# Patient Record
Sex: FEMALE | Race: WHITE | NOT HISPANIC OR LATINO
[De-identification: names, ages, dates, MRNs, and addresses within clinical notes are randomized per-mention and may not be internally consistent; named-entity substitution may affect disease eponyms.]

---

## 2023-10-04 VITALS
WEIGHT: 161 LBS | HEIGHT: 60 IN | BODY MASS INDEX: 31.61 KG/M2 | RESPIRATION RATE: 51 BRPM | HEART RATE: 94 BPM | OXYGEN SATURATION: 96 % | DIASTOLIC BLOOD PRESSURE: 70 MMHG | SYSTOLIC BLOOD PRESSURE: 142 MMHG

## 2023-10-12 ENCOUNTER — NON-APPOINTMENT (OUTPATIENT)
Age: 88
End: 2023-10-12

## 2023-10-12 DIAGNOSIS — Z87.898 PERSONAL HISTORY OF OTHER SPECIFIED CONDITIONS: ICD-10-CM

## 2023-10-12 DIAGNOSIS — Z86.79 PERSONAL HISTORY OF OTHER DISEASES OF THE CIRCULATORY SYSTEM: ICD-10-CM

## 2023-10-12 DIAGNOSIS — Z80.0 FAMILY HISTORY OF MALIGNANT NEOPLASM OF DIGESTIVE ORGANS: ICD-10-CM

## 2023-10-12 DIAGNOSIS — Z86.59 PERSONAL HISTORY OF OTHER MENTAL AND BEHAVIORAL DISORDERS: ICD-10-CM

## 2023-10-12 DIAGNOSIS — Z86.39 PERSONAL HISTORY OF OTHER ENDOCRINE, NUTRITIONAL AND METABOLIC DISEASE: ICD-10-CM

## 2023-10-12 DIAGNOSIS — Z85.3 PERSONAL HISTORY OF MALIGNANT NEOPLASM OF BREAST: ICD-10-CM

## 2023-10-12 PROBLEM — Z00.00 ENCOUNTER FOR PREVENTIVE HEALTH EXAMINATION: Status: ACTIVE | Noted: 2023-10-12

## 2023-10-30 ENCOUNTER — NON-APPOINTMENT (OUTPATIENT)
Age: 88
End: 2023-10-30

## 2023-10-30 ENCOUNTER — APPOINTMENT (OUTPATIENT)
Dept: HEART AND VASCULAR | Facility: CLINIC | Age: 88
End: 2023-10-30
Payer: MEDICARE

## 2023-10-30 VITALS
HEART RATE: 97 BPM | OXYGEN SATURATION: 96 % | DIASTOLIC BLOOD PRESSURE: 79 MMHG | SYSTOLIC BLOOD PRESSURE: 167 MMHG | RESPIRATION RATE: 14 BRPM

## 2023-10-30 DIAGNOSIS — I49.3 VENTRICULAR PREMATURE DEPOLARIZATION: ICD-10-CM

## 2023-10-30 PROCEDURE — 99205 OFFICE O/P NEW HI 60 MIN: CPT | Mod: 25

## 2023-10-30 PROCEDURE — 93000 ELECTROCARDIOGRAM COMPLETE: CPT

## 2023-10-30 RX ORDER — VENLAFAXINE HYDROCHLORIDE 37.5 MG/1
37.5 CAPSULE, EXTENDED RELEASE ORAL DAILY
Refills: 0 | Status: DISCONTINUED | COMMUNITY
End: 2023-10-30

## 2023-10-30 RX ORDER — OMEPRAZOLE 40 MG/1
40 CAPSULE, DELAYED RELEASE ORAL TWICE DAILY
Refills: 0 | Status: DISCONTINUED | COMMUNITY
End: 2023-10-30

## 2023-11-09 ENCOUNTER — APPOINTMENT (OUTPATIENT)
Dept: HEART AND VASCULAR | Facility: CLINIC | Age: 88
End: 2023-11-09
Payer: MEDICARE

## 2023-11-09 PROCEDURE — 93306 TTE W/DOPPLER COMPLETE: CPT

## 2023-11-22 ENCOUNTER — APPOINTMENT (OUTPATIENT)
Dept: INTERNAL MEDICINE | Facility: CLINIC | Age: 88
End: 2023-11-22
Payer: MEDICARE

## 2023-11-22 VITALS
DIASTOLIC BLOOD PRESSURE: 74 MMHG | SYSTOLIC BLOOD PRESSURE: 152 MMHG | OXYGEN SATURATION: 97 % | RESPIRATION RATE: 16 BRPM | HEART RATE: 89 BPM | WEIGHT: 155 LBS | HEIGHT: 60 IN | BODY MASS INDEX: 30.43 KG/M2

## 2023-11-22 DIAGNOSIS — R42 DIZZINESS AND GIDDINESS: ICD-10-CM

## 2023-11-22 PROCEDURE — 99213 OFFICE O/P EST LOW 20 MIN: CPT | Mod: 25

## 2023-11-22 PROCEDURE — 99203 OFFICE O/P NEW LOW 30 MIN: CPT | Mod: 25

## 2023-11-22 PROCEDURE — 36415 COLL VENOUS BLD VENIPUNCTURE: CPT

## 2023-11-22 RX ORDER — DILTIAZEM HYDROCHLORIDE 180 MG/1
180 CAPSULE, COATED, EXTENDED RELEASE ORAL DAILY
Refills: 0 | Status: DISCONTINUED | COMMUNITY
End: 2023-11-22

## 2023-11-27 LAB
ALBUMIN SERPL ELPH-MCNC: 4.2 G/DL
ALP BLD-CCNC: 105 U/L
ALT SERPL-CCNC: 8 U/L
ANION GAP SERPL CALC-SCNC: 13 MMOL/L
AST SERPL-CCNC: 18 U/L
BASOPHILS # BLD AUTO: 0.06 K/UL
BASOPHILS NFR BLD AUTO: 0.7 %
BILIRUB SERPL-MCNC: 0.5 MG/DL
BUN SERPL-MCNC: 23 MG/DL
CALCIUM SERPL-MCNC: 9.7 MG/DL
CHLORIDE SERPL-SCNC: 95 MMOL/L
CHOLEST SERPL-MCNC: 215 MG/DL
CO2 SERPL-SCNC: 24 MMOL/L
CREAT SERPL-MCNC: 0.91 MG/DL
EGFR: 61 ML/MIN/1.73M2
EOSINOPHIL # BLD AUTO: 0.63 K/UL
EOSINOPHIL NFR BLD AUTO: 7.2 %
GLUCOSE SERPL-MCNC: 107 MG/DL
HCT VFR BLD CALC: 42.3 %
HDLC SERPL-MCNC: 56 MG/DL
HGB BLD-MCNC: 14.4 G/DL
IMM GRANULOCYTES NFR BLD AUTO: 0.5 %
LDLC SERPL CALC-MCNC: 138 MG/DL
LYMPHOCYTES # BLD AUTO: 1.36 K/UL
LYMPHOCYTES NFR BLD AUTO: 15.6 %
MAN DIFF?: NORMAL
MCHC RBC-ENTMCNC: 31.6 PG
MCHC RBC-ENTMCNC: 34 GM/DL
MCV RBC AUTO: 93 FL
MONOCYTES # BLD AUTO: 0.64 K/UL
MONOCYTES NFR BLD AUTO: 7.3 %
NEUTROPHILS # BLD AUTO: 5.99 K/UL
NEUTROPHILS NFR BLD AUTO: 68.7 %
NONHDLC SERPL-MCNC: 160 MG/DL
PLATELET # BLD AUTO: 261 K/UL
POTASSIUM SERPL-SCNC: 4.7 MMOL/L
PROT SERPL-MCNC: 7 G/DL
RBC # BLD: 4.55 M/UL
RBC # FLD: 13.2 %
SODIUM SERPL-SCNC: 132 MMOL/L
TRIGL SERPL-MCNC: 120 MG/DL
WBC # FLD AUTO: 8.72 K/UL

## 2023-11-29 RX ORDER — ATORVASTATIN CALCIUM 10 MG/1
10 TABLET, FILM COATED ORAL DAILY
Qty: 90 | Refills: 1 | Status: ACTIVE | COMMUNITY
Start: 1900-01-01 | End: 1900-01-01

## 2023-11-30 LAB — TSH SERPL-ACNC: 2.85 UIU/ML

## 2023-12-07 ENCOUNTER — APPOINTMENT (OUTPATIENT)
Dept: HEART AND VASCULAR | Facility: CLINIC | Age: 88
End: 2023-12-07
Payer: MEDICARE

## 2023-12-07 ENCOUNTER — APPOINTMENT (OUTPATIENT)
Dept: INTERNAL MEDICINE | Facility: CLINIC | Age: 88
End: 2023-12-07
Payer: MEDICARE

## 2023-12-07 VITALS
HEART RATE: 85 BPM | HEIGHT: 62 IN | RESPIRATION RATE: 15 BRPM | SYSTOLIC BLOOD PRESSURE: 142 MMHG | DIASTOLIC BLOOD PRESSURE: 74 MMHG | BODY MASS INDEX: 28.52 KG/M2 | WEIGHT: 155 LBS | OXYGEN SATURATION: 98 %

## 2023-12-07 DIAGNOSIS — R05.3 CHRONIC COUGH: ICD-10-CM

## 2023-12-07 PROCEDURE — 99214 OFFICE O/P EST MOD 30 MIN: CPT

## 2023-12-07 PROCEDURE — 99213 OFFICE O/P EST LOW 20 MIN: CPT

## 2024-01-04 ENCOUNTER — APPOINTMENT (OUTPATIENT)
Dept: HEART AND VASCULAR | Facility: CLINIC | Age: 89
End: 2024-01-04
Payer: MEDICARE

## 2024-01-04 VITALS
HEART RATE: 83 BPM | TEMPERATURE: 97.4 F | HEIGHT: 62 IN | DIASTOLIC BLOOD PRESSURE: 79 MMHG | SYSTOLIC BLOOD PRESSURE: 129 MMHG | OXYGEN SATURATION: 98 % | RESPIRATION RATE: 16 BRPM

## 2024-01-04 PROCEDURE — G2211 COMPLEX E/M VISIT ADD ON: CPT

## 2024-01-04 PROCEDURE — 99214 OFFICE O/P EST MOD 30 MIN: CPT

## 2024-01-04 NOTE — ASSESSMENT
[FreeTextEntry1] : TARA COLEMAN is an 88 year F with past medical history significant for HTN, hypothyroidism, CVA (without residual neuro deficit), breast cancer s/p right partial mastectomy (2006) s/p RT (2006), anxiety disorder,. She is here for follow-up post ED visit for palpitations. She was also previously in the ED for syncope? TIA?. She has progressive weakness and decrease functional capacity. She has chronic limited exercise capacity, she needs assistance. She is on wheelchair. Holter recorded brief runs of PSVT and NSVT. Echo was technically difficult. No significant valve disease. Endocardial wall not well visualized. BP is better controlled. She declines having ischemia work-up.   - I reviewed Holter  - I reviewed Echo report - I reviewed labs  - I reviewed CT head - start a trial of ASA 81 mg QOD for now.  - increase diltiazem to 360 mg daily for better arrhythmia suppression.  - decrease valsartan 80 mg daily  - we discussed the option of taking statins --> she declines - continue taking other cardiac meds  - will plan to repeat holter to assess arrhythmia control. - We discussed the option of having a NST. For now she does not wish to have any further cardiac testing. She will think about it.  - she will monitor her BP at home and bring a BP log to next visit   Patient advised to go to the nearest ED whenever any symptoms persist and/or worsens.  Patient/Family/Caregiver verbalized complete understanding of these instructions.  I spent a total of 25 minutes with more than 50% spent on counseling and coordinating care.    RTO in 4 wks .

## 2024-01-04 NOTE — HISTORY OF PRESENT ILLNESS
[FreeTextEntry1] : TARA COLEMAN is a 88 year y.o. F with medical history significant for HTN, hypothyroidism, CVA (without residual neuro deficit), anxiety disorder, breast cancer s/p right partial mastectomy (2006) s/p RT (2006). She is here with her home attendant.  She is here for follow-up.  She reports having on and off palpitations. Occurs almost daily. Sometimes can last up to hours.  She was in the ED on 12/27/23 for palpitations. Work-up was neg. She was not admitted.   Echo was technically difficult. unable to visualize well the endocardial wall. No significant valve disease. Grade IDD.  Holter recorded brief runs of PSVT and NSVT.  BP has been better controlled on higher cardizem dose. She overall feels well.  No recurrent syncope.  She has no cardiac complaints at this time.  She states she has been anxious.   Her functional capacity has deteriorated. She is on wheelchair. Her legs are weak and needs assistance.    Denies CP, SOB, palpitations, orthopnea, edema Patient denies changes in her exercise tolerance during the past 6 months. She has limited functional capacity. She walks in her apartment with a walker and goes out mostly on a wheelchair.  She reports having poor balance since 2019.  She reports on and off headaches. She has not seen neuro  Sleeps with 3 pillows due to GERD.    Of note,  In Oct 2023, she had an ED visit for "syncope".  She reports that she was in shower with her home attendant assisting her as usual. When she was ready to exit the shower she was unable to the lift her right leg to get out of the tub. She tried a couple of times and her home attendant helped her get out and sit on the toilet. She subsequently became unresponsive and weak. She leaned her against the sink. Home attendant thought patient had fainted. The patient states she was able to hear the home attendant speaking to her but she was unable to respond. The patient denies palpitations, CP, SOB, dizziness. The home attendant states episode lasted about 10 min. She called EMS and by the time they arrived she was back to her usual state of health. Her BP was normal. She was taken to the ED ([facility]). CT head without acute findings. Trops were ok. She was not admitted. She was discharged with outpatient follow-up. TIA?    She denies history of MI, DM, smoking.  Denies family history of premature ASCVD and /or SCD  No hospitalizations in the past 3 years.   DATA LABS (12/27/23): Trop Neg; Hgb 14.6; Hct 44.1; Cre 0.9; K 4.1; LFTs wnl; eGFR 61;  (11/22/23) HGB 14.4, HCT 42.3, K 4.7, CRE 0.91, AST 18, ALT 8, , eGFR 61, , , HDL 56, , NON-, TSH 2.85. (10/18/23): Cre 0.7; K 4.1; eGFR 73; LFTs wnl; Hgb 13.9; Hct 40.2; Plt 279;  (10/10/23): INR 1.1; HS trop 5 x2 (nl); eGFR 54; ALT 59; AST 41; Cre 1.0; K 3.9; Hgb 14.2; Hct 41.8;   ECG (10/30/23): NSR at 89 bpm w nl axis, first degree AVB (Alecia= 226 ms), LVH, PVCs, PRWP and nonspecific ST abn.   ECHO (11/9/23): Technically difficult study. The LV endocardium is not well visualized; cannot r/o RWMA. Grade IDD w normal filling pressures. Mildly dilated LA. AV sclerosis w/o stenosis. No significant valve disease   CXR (12/27/23): The cardiac silhouette is wnl. No definite focal consolidation. Elevation of the right hemidiaphragm.   Holter x6d (11/30/23): min 49 bpm, Avg 69 bpm, Max 132 bpm. NSR with brief PSVT x 13 (longest 7 beats) and brief NSVT x 3 (longest 8 beats). rare PVCs <1%. No pauses.   CT head (10/10/23): No CT evidence of acute ICH or cerebral edema. Chronic infarct within the inferior right frontal lobe and microvascular ischemic changes are noted.

## 2024-01-04 NOTE — REVIEW OF SYSTEMS
[Feeling Fatigued] : feeling fatigued [Dyspnea on exertion] : dyspnea during exertion [Dizziness] : dizziness [Limb Weakness (Paresis)] : limb weakness (Paresis) [Anxiety] : anxiety [Palpitations] : palpitations [Fever] : no fever [Headache] : no headache [Chills] : no chills [SOB] : no shortness of breath [Chest Discomfort] : no chest discomfort [Lower Ext Edema] : no extremity edema [Leg Claudication] : no intermittent leg claudication [Orthopnea] : no orthopnea [PND] : no PND [Syncope] : no syncope [Cough] : no cough [Wheezing] : no wheezing [Coughing Up Blood] : no hemoptysis [Tremor] : no tremor was seen [Convulsions] : no convulsions [Tingling (Paresthesia)] : no tingling [Confusion] : no confusion was observed [Depression] : no depression [Under Stress] : not under stress [Suicidal] : not suicidal [Easy Bleeding] : no tendency for easy bleeding

## 2024-01-04 NOTE — PHYSICAL EXAM
[Well Developed] : well developed [Well Nourished] : well nourished [No Acute Distress] : no acute distress [Normal Venous Pressure] : normal venous pressure [No Carotid Bruit] : no carotid bruit [Normal S1, S2] : normal S1, S2 [No Murmur] : no murmur [No Rub] : no rub [No Gallop] : no gallop [Clear Lung Fields] : clear lung fields [Good Air Entry] : good air entry [No Respiratory Distress] : no respiratory distress  [No Edema] : no edema [No Cyanosis] : no cyanosis [No Clubbing] : no clubbing [Moves all extremities] : moves all extremities [No Focal Deficits] : no focal deficits [Normal Speech] : normal speech [Alert and Oriented] : alert and oriented [Normal memory] : normal memory [de-identified] : on wheelchair, needs assistance

## 2024-01-30 ENCOUNTER — APPOINTMENT (OUTPATIENT)
Dept: INTERNAL MEDICINE | Facility: CLINIC | Age: 89
End: 2024-01-30
Payer: MEDICARE

## 2024-01-30 VITALS
DIASTOLIC BLOOD PRESSURE: 70 MMHG | RESPIRATION RATE: 16 BRPM | HEIGHT: 62 IN | SYSTOLIC BLOOD PRESSURE: 130 MMHG | HEART RATE: 77 BPM | OXYGEN SATURATION: 98 %

## 2024-01-30 PROCEDURE — 36415 COLL VENOUS BLD VENIPUNCTURE: CPT

## 2024-01-30 PROCEDURE — 99214 OFFICE O/P EST MOD 30 MIN: CPT

## 2024-01-30 NOTE — HISTORY OF PRESENT ILLNESS
[FreeTextEntry1] : periodic  [de-identified] :  Patient here for periodic assessment and blood work.

## 2024-01-30 NOTE — ASSESSMENT
[FreeTextEntry1] : advised to re-start effexor 2/2 worsening anxiety  advised neurology f/u regarding chronic headaches  advised optho f/u 2/2 possible cataracts

## 2024-02-02 LAB
ALBUMIN SERPL ELPH-MCNC: 4.1 G/DL
ALP BLD-CCNC: 115 U/L
ALT SERPL-CCNC: 11 U/L
ANION GAP SERPL CALC-SCNC: 12 MMOL/L
AST SERPL-CCNC: 16 U/L
BASOPHILS # BLD AUTO: 0.05 K/UL
BASOPHILS NFR BLD AUTO: 0.6 %
BILIRUB SERPL-MCNC: 0.3 MG/DL
BUN SERPL-MCNC: 26 MG/DL
CALCIUM SERPL-MCNC: 9.2 MG/DL
CHLORIDE SERPL-SCNC: 97 MMOL/L
CHOLEST SERPL-MCNC: 195 MG/DL
CO2 SERPL-SCNC: 24 MMOL/L
CREAT SERPL-MCNC: 1.1 MG/DL
EGFR: 48 ML/MIN/1.73M2
EOSINOPHIL # BLD AUTO: 0.46 K/UL
EOSINOPHIL NFR BLD AUTO: 5.7 %
ESTIMATED AVERAGE GLUCOSE: 108 MG/DL
GLUCOSE SERPL-MCNC: 98 MG/DL
HBA1C MFR BLD HPLC: 5.4 %
HCT VFR BLD CALC: 40.1 %
HDLC SERPL-MCNC: 54 MG/DL
HGB BLD-MCNC: 13.6 G/DL
IMM GRANULOCYTES NFR BLD AUTO: 0.4 %
LDLC SERPL CALC-MCNC: 122 MG/DL
LYMPHOCYTES # BLD AUTO: 1.42 K/UL
LYMPHOCYTES NFR BLD AUTO: 17.6 %
MAN DIFF?: NORMAL
MCHC RBC-ENTMCNC: 32.5 PG
MCHC RBC-ENTMCNC: 33.9 GM/DL
MCV RBC AUTO: 95.9 FL
MONOCYTES # BLD AUTO: 0.62 K/UL
MONOCYTES NFR BLD AUTO: 7.7 %
NEUTROPHILS # BLD AUTO: 5.49 K/UL
NEUTROPHILS NFR BLD AUTO: 68 %
NONHDLC SERPL-MCNC: 141 MG/DL
PLATELET # BLD AUTO: 296 K/UL
POTASSIUM SERPL-SCNC: 4.7 MMOL/L
PROT SERPL-MCNC: 6.9 G/DL
RBC # BLD: 4.18 M/UL
RBC # FLD: 14.3 %
SODIUM SERPL-SCNC: 134 MMOL/L
TRIGL SERPL-MCNC: 104 MG/DL
TSH SERPL-ACNC: 2.93 UIU/ML
WBC # FLD AUTO: 8.07 K/UL

## 2024-02-08 ENCOUNTER — APPOINTMENT (OUTPATIENT)
Dept: HEART AND VASCULAR | Facility: CLINIC | Age: 89
End: 2024-02-08
Payer: MEDICARE

## 2024-02-08 VITALS
DIASTOLIC BLOOD PRESSURE: 67 MMHG | TEMPERATURE: 97.4 F | RESPIRATION RATE: 16 BRPM | OXYGEN SATURATION: 98 % | HEIGHT: 62 IN | SYSTOLIC BLOOD PRESSURE: 153 MMHG | HEART RATE: 68 BPM

## 2024-02-08 PROCEDURE — 99213 OFFICE O/P EST LOW 20 MIN: CPT

## 2024-02-08 RX ORDER — LEVOTHYROXINE SODIUM 0.11 MG/1
112 TABLET ORAL DAILY
Qty: 90 | Refills: 1 | Status: ACTIVE | COMMUNITY
Start: 1900-01-01 | End: 1900-01-01

## 2024-02-08 NOTE — REVIEW OF SYSTEMS
[Feeling Fatigued] : feeling fatigued [Dyspnea on exertion] : dyspnea during exertion [Palpitations] : palpitations [Dizziness] : dizziness [Limb Weakness (Paresis)] : limb weakness (Paresis) [Anxiety] : anxiety [Fever] : no fever [Headache] : no headache [Chills] : no chills [SOB] : no shortness of breath [Chest Discomfort] : no chest discomfort [Lower Ext Edema] : no extremity edema [Leg Claudication] : no intermittent leg claudication [Orthopnea] : no orthopnea [PND] : no PND [Syncope] : no syncope [Cough] : no cough [Wheezing] : no wheezing [Coughing Up Blood] : no hemoptysis [Tremor] : no tremor was seen [Convulsions] : no convulsions [Tingling (Paresthesia)] : no tingling [Confusion] : no confusion was observed [Depression] : no depression [Under Stress] : not under stress [Suicidal] : not suicidal [Easy Bleeding] : no tendency for easy bleeding

## 2024-02-08 NOTE — ASSESSMENT
[FreeTextEntry1] : TARA COLEMAN is an 88 year F with past medical history significant for HTN, hypothyroidism, CVA (without residual neuro deficit), breast cancer s/p right partial mastectomy (2006) s/p RT (2006), anxiety disorder,. She is here for follow-up. She reports on and off palpitations. She has progressive weakness and decrease functional capacity. She has chronic limited exercise capacity, she needs assistance. She is on wheelchair. Holter recorded brief runs of PSVT and NSVT. Echo was technically difficult. No significant valve disease. Endocardial wall not well visualized. BP is better controlled. She declines having ischemia work-up.   - I reviewed Holter  - I reviewed Echo report - I reviewed labs from 1/30/24 - I reviewed CT head - add toprol xl 12.5 mg daily  - on of ASA 81 mg QOD - increase diltiazem to 360 mg daily for better arrhythmia suppression.  - Valsartan 80 mg daily  - we discussed the option of taking statins --> she declines - continue taking other cardiac meds  - will plan to repeat holter to assess arrhythmia control. - We discussed the option of having a NST. For now she does not wish to have any further cardiac testing. She will think about it.  - she will monitor her BP at home and bring a BP log to next visit   Patient advised to go to the nearest ED whenever any symptoms persist and/or worsens.  Patient/Family/Caregiver verbalized complete understanding of these instructions.  I spent a total of 25 minutes with more than 50% spent on counseling and coordinating care.   RTO in 2 mo

## 2024-02-08 NOTE — HISTORY OF PRESENT ILLNESS
[FreeTextEntry1] : TARA COLEMAN is a 88 year y.o. F with medical history significant for HTN, hypothyroidism, CVA (without residual neuro deficit), anxiety disorder, breast cancer s/p right partial mastectomy (2006) s/p RT (2006). She is here with her home attendant.  She is here for follow-up.  She reports having on and off palpitations. Occurs almost daily. Sometimes can last up to hours.  She was in the ED on 12/27/23 for palpitations. Work-up was neg. She was not admitted.   Echo was technically difficult. unable to visualize well the endocardial wall. No significant valve disease. Grade IDD.  Holter recorded brief runs of PSVT and NSVT.  BP has been better controlled on higher cardizem dose. She overall feels well.  No recurrent syncope.  She has no cardiac complaints at this time.  She states she has been anxious.   Her functional capacity has deteriorated. She is on wheelchair. Her legs are weak and needs assistance.    Denies CP, SOB, palpitations, orthopnea, edema Patient denies changes in her exercise tolerance during the past 6 months. She has limited functional capacity. She walks in her apartment with a walker and goes out mostly on a wheelchair.  She reports having poor balance since 2019.  She reports on and off headaches. She has not seen neuro  Sleeps with 3 pillows due to GERD.    Of note,  In Oct 2023, she had an ED visit for "syncope".  She reports that she was in shower with her home attendant assisting her as usual. When she was ready to exit the shower she was unable to the lift her right leg to get out of the tub. She tried a couple of times and her home attendant helped her get out and sit on the toilet. She subsequently became unresponsive and weak. She leaned her against the sink. Home attendant thought patient had fainted. The patient states she was able to hear the home attendant speaking to her but she was unable to respond. The patient denies palpitations, CP, SOB, dizziness. The home attendant states episode lasted about 10 min. She called EMS and by the time they arrived she was back to her usual state of health. Her BP was normal. She was taken to the ED ([facility]). CT head without acute findings. Trops were ok. She was not admitted. She was discharged with outpatient follow-up. TIA?    She denies history of MI, DM, smoking.  Denies family history of premature ASCVD and /or SCD  No hospitalizations in the past 3 years.   DATA LABS (1/30/24) TSH 2.93, K 4.7, CRE 1.10, LFT's wnl, eGFR 48, , , HDL 54, , NON-, A1C 5.4, HCT 40.1, HGB 13.6 (12/27/23): Trop Neg; Hgb 14.6; Hct 44.1; Cre 0.9; K 4.1; LFTs wnl; eGFR 61;  (11/22/23) HGB 14.4, HCT 42.3, K 4.7, CRE 0.91, AST 18, ALT 8, , eGFR 61, , , HDL 56, , NON-, TSH 2.85. (10/18/23): Cre 0.7; K 4.1; eGFR 73; LFTs wnl; Hgb 13.9; Hct 40.2; Plt 279;  (10/10/23): INR 1.1; HS trop 5 x2 (nl); eGFR 54; ALT 59; AST 41; Cre 1.0; K 3.9; Hgb 14.2; Hct 41.8;   ECG (10/30/23): NSR at 89 bpm w nl axis, first degree AVB (Alecia= 226 ms), LVH, PVCs, PRWP and nonspecific ST abn.   ECHO (11/9/23): Technically difficult study. The LV endocardium is not well visualized; cannot r/o RWMA. Grade IDD w normal filling pressures. Mildly dilated LA. AV sclerosis w/o stenosis. No significant valve disease   CXR (12/27/23): The cardiac silhouette is wnl. No definite focal consolidation. Elevation of the right hemidiaphragm.   Holter x6d (11/30/23): min 49 bpm, Avg 69 bpm, Max 132 bpm. NSR with brief PSVT x 13 (longest 7 beats) and brief NSVT x 3 (longest 8 beats). rare PVCs <1%. No pauses.   CT head (10/10/23): No CT evidence of acute ICH or cerebral edema. Chronic infarct within the inferior right frontal lobe and microvascular ischemic changes are noted.

## 2024-02-08 NOTE — PHYSICAL EXAM
[Well Developed] : well developed [Well Nourished] : well nourished [No Acute Distress] : no acute distress [Normal Venous Pressure] : normal venous pressure [No Carotid Bruit] : no carotid bruit [Normal S1, S2] : normal S1, S2 [No Murmur] : no murmur [No Rub] : no rub [No Gallop] : no gallop [Clear Lung Fields] : clear lung fields [Good Air Entry] : good air entry [No Respiratory Distress] : no respiratory distress  [No Edema] : no edema [No Cyanosis] : no cyanosis [No Clubbing] : no clubbing [Moves all extremities] : moves all extremities [No Focal Deficits] : no focal deficits [Normal Speech] : normal speech [Alert and Oriented] : alert and oriented [Normal memory] : normal memory [de-identified] : on wheelchair, needs assistance

## 2024-04-11 ENCOUNTER — APPOINTMENT (OUTPATIENT)
Dept: HEART AND VASCULAR | Facility: CLINIC | Age: 89
End: 2024-04-11
Payer: MEDICARE

## 2024-04-11 ENCOUNTER — APPOINTMENT (OUTPATIENT)
Dept: INTERNAL MEDICINE | Facility: CLINIC | Age: 89
End: 2024-04-11
Payer: MEDICARE

## 2024-04-11 VITALS
OXYGEN SATURATION: 98 % | DIASTOLIC BLOOD PRESSURE: 76 MMHG | TEMPERATURE: 97 F | BODY MASS INDEX: 28.52 KG/M2 | HEART RATE: 61 BPM | SYSTOLIC BLOOD PRESSURE: 159 MMHG | HEIGHT: 62 IN | WEIGHT: 155 LBS | RESPIRATION RATE: 16 BRPM

## 2024-04-11 DIAGNOSIS — I47.29 OTHER VENTRICULAR TACHYCARDIA: ICD-10-CM

## 2024-04-11 DIAGNOSIS — E03.9 HYPOTHYROIDISM, UNSPECIFIED: ICD-10-CM

## 2024-04-11 PROCEDURE — 36415 COLL VENOUS BLD VENIPUNCTURE: CPT

## 2024-04-11 PROCEDURE — 99214 OFFICE O/P EST MOD 30 MIN: CPT | Mod: 25

## 2024-04-11 PROCEDURE — 93270 REMOTE 30 DAY ECG REV/REPORT: CPT

## 2024-04-11 PROCEDURE — 99214 OFFICE O/P EST MOD 30 MIN: CPT

## 2024-04-11 RX ORDER — METOPROLOL SUCCINATE 25 MG/1
25 TABLET, EXTENDED RELEASE ORAL
Qty: 30 | Refills: 3 | Status: DISCONTINUED | COMMUNITY
Start: 2024-02-08 | End: 2024-04-11

## 2024-04-11 NOTE — PHYSICAL EXAM
[Well Developed] : well developed [Well Nourished] : well nourished [No Acute Distress] : no acute distress [Normal Venous Pressure] : normal venous pressure [No Carotid Bruit] : no carotid bruit [Normal S1, S2] : normal S1, S2 [No Murmur] : no murmur [No Rub] : no rub [No Gallop] : no gallop [Clear Lung Fields] : clear lung fields [Good Air Entry] : good air entry [No Respiratory Distress] : no respiratory distress  [No Edema] : no edema [No Cyanosis] : no cyanosis [No Clubbing] : no clubbing [Moves all extremities] : moves all extremities [No Focal Deficits] : no focal deficits [Normal Speech] : normal speech [Alert and Oriented] : alert and oriented [Normal memory] : normal memory [de-identified] : on wheelchair, needs assistance

## 2024-04-11 NOTE — ASSESSMENT
[FreeTextEntry1] : TARA COLEMAN is an 89 year F with past medical history significant for HTN, hypothyroidism, CVA (without residual neuro deficit), breast cancer s/p right partial mastectomy (2006) s/p RT (2006), PSVT/NSVT (on CCB+BB), anxiety disorder. She is here for follow-up. She reports on and off palpitations that have worsened during the past few weeks. She has progressive weakness and decrease functional capacity. She has chronic limited exercise capacity, she needs assistance. She is on wheelchair. Echo was technically difficult. No significant valve disease. Endocardial wall not well visualized. BP is better controlled. She declines having ischemia work-up.   - I reviewed labs from 1/30/24 - I reviewed CT head - place a holter x 14 days to assess arrhythmia burden - Continue diltiazem to 360 mg daily for better arrhythmia suppression.  - continue Valsartan 80 mg daily  - we discussed the option of taking statins --> she declines - continue taking other cardiac meds  - will plan to repeat holter to assess arrhythmia control. - We discussed the option of having a NST. For now she does not wish to have any further cardiac testing. She will think about it.  - she will monitor her BP at home and bring a BP log to next visit   Patient advised to go to the nearest ED whenever any symptoms persist and/or worsens.  Patient/Family/Caregiver verbalized complete understanding of these instructions.  I spent a total of 25 minutes with more than 50% spent on counseling and coordinating care.   RTO after test results are available.

## 2024-04-11 NOTE — HISTORY OF PRESENT ILLNESS
[FreeTextEntry1] : TARA COLEMAN is a 88 year y.o. F with medical history significant for HTN, hypothyroidism, CVA (without residual neuro deficit), anxiety disorder, breast cancer s/p right partial mastectomy (2006) s/p RT (2006). She is here with her home attendant.  She is here for follow-up.  She states for some time palpitations were controlled on higher cardizem dose but has noticed that lately palpitations have increased in frequency during the past 3 wks and now occur almost daily.  Sometimes can last up to hours. She previously did not tolerate toprol 12.5 mg  Echo was technically difficult. unable to visualize well the endocardial wall. No significant valve disease. Grade IDD.  Holter recorded brief runs of PSVT and NSVT.  BP has been better controlled on higher cardizem dose. She overall feels well.  No recurrent syncope.  She has no cardiac complaints at this time.  She states she has been anxious.   Her functional capacity has deteriorated. She is on wheelchair. Her legs are weak and needs assistance.    Denies CP, SOB, palpitations, orthopnea, edema Patient denies changes in her exercise tolerance during the past 6 months. She has limited functional capacity. She walks in her apartment with a walker and goes out mostly on a wheelchair.  She reports having poor balance since 2019.  She reports on and off headaches. She has not seen neuro  Sleeps with 3 pillows due to GERD.    Of note,  In Oct 2023, she had an ED visit for "syncope".  She reports that she was in shower with her home attendant assisting her as usual. When she was ready to exit the shower she was unable to the lift her right leg to get out of the tub. She tried a couple of times and her home attendant helped her get out and sit on the toilet. She subsequently became unresponsive and weak. She leaned her against the sink. Home attendant thought patient had fainted. The patient states she was able to hear the home attendant speaking to her but she was unable to respond. The patient denies palpitations, CP, SOB, dizziness. The home attendant states episode lasted about 10 min. She called EMS and by the time they arrived she was back to her usual state of health. Her BP was normal. She was taken to the ED ([facility]). CT head without acute findings. Trops were ok. She was not admitted. She was discharged with outpatient follow-up. TIA?    She denies history of MI, DM, smoking.  Denies family history of premature ASCVD and /or SCD  No hospitalizations in the past 3 years.   DATA LABS (1/30/24) TSH 2.93, K 4.7, CRE 1.10, LFT's wnl, eGFR 48, , , HDL 54, , NON-, A1C 5.4, HCT 40.1, HGB 13.6 (12/27/23): Trop Neg; Hgb 14.6; Hct 44.1; Cre 0.9; K 4.1; LFTs wnl; eGFR 61;  (11/22/23) HGB 14.4, HCT 42.3, K 4.7, CRE 0.91, AST 18, ALT 8, , eGFR 61, , , HDL 56, , NON-, TSH 2.85. (10/18/23): Cre 0.7; K 4.1; eGFR 73; LFTs wnl; Hgb 13.9; Hct 40.2; Plt 279;  (10/10/23): INR 1.1; HS trop 5 x2 (nl); eGFR 54; ALT 59; AST 41; Cre 1.0; K 3.9; Hgb 14.2; Hct 41.8;   ECG (12/27/23): NSR at 69 bpm w 1st degree AVB (ROi=429 ms), LVH PRWP and no STT abn  (10/30/23): NSR at 89 bpm w nl axis, first degree AVB (Alecia= 226 ms), LVH, PVCs, PRWP and nonspecific ST abn.   ECHO (11/9/23): Technically difficult study. The LV endocardium is not well visualized; cannot r/o RWMA. Grade IDD w normal filling pressures. Mildly dilated LA. AV sclerosis w/o stenosis. No significant valve disease   CXR (12/27/23): The cardiac silhouette is wnl. No definite focal consolidation. Elevation of the right hemidiaphragm.   Holter x6d (11/30/23): min 49 bpm, Avg 69 bpm, Max 132 bpm. NSR with brief PSVT x 13 (longest 7 beats) and brief NSVT x 3 (longest 8 beats). rare PVCs <1%. No pauses.   CT head (10/10/23): No CT evidence of acute ICH or cerebral edema. Chronic infarct within the inferior right frontal lobe and microvascular ischemic changes are noted.

## 2024-04-11 NOTE — HISTORY OF PRESENT ILLNESS
[FreeTextEntry1] :  Patient here for periodic assessment and blood work. [de-identified] :  Patient here for periodic assessment and blood work.

## 2024-04-15 ENCOUNTER — APPOINTMENT (OUTPATIENT)
Dept: HEART AND VASCULAR | Facility: CLINIC | Age: 89
End: 2024-04-15

## 2024-04-21 LAB
ALBUMIN SERPL ELPH-MCNC: 4.2 G/DL
ALP BLD-CCNC: 125 U/L
ALT SERPL-CCNC: 10 U/L
ANION GAP SERPL CALC-SCNC: 13 MMOL/L
AST SERPL-CCNC: 19 U/L
BASOPHILS # BLD AUTO: 0.06 K/UL
BASOPHILS NFR BLD AUTO: 0.9 %
BILIRUB SERPL-MCNC: 0.3 MG/DL
BUN SERPL-MCNC: 24 MG/DL
CALCIUM SERPL-MCNC: 9.9 MG/DL
CHLORIDE SERPL-SCNC: 98 MMOL/L
CHOLEST SERPL-MCNC: 219 MG/DL
CO2 SERPL-SCNC: 25 MMOL/L
CREAT SERPL-MCNC: 1 MG/DL
EGFR: 54 ML/MIN/1.73M2
EOSINOPHIL # BLD AUTO: 0.29 K/UL
EOSINOPHIL NFR BLD AUTO: 4.3 %
ESTIMATED AVERAGE GLUCOSE: 114 MG/DL
GLUCOSE SERPL-MCNC: 98 MG/DL
HBA1C MFR BLD HPLC: 5.6 %
HCT VFR BLD CALC: 41 %
HDLC SERPL-MCNC: 58 MG/DL
HGB BLD-MCNC: 13.6 G/DL
IMM GRANULOCYTES NFR BLD AUTO: 0.3 %
LDLC SERPL CALC-MCNC: 139 MG/DL
LYMPHOCYTES # BLD AUTO: 1.35 K/UL
LYMPHOCYTES NFR BLD AUTO: 19.8 %
MAN DIFF?: NORMAL
MCHC RBC-ENTMCNC: 32.2 PG
MCHC RBC-ENTMCNC: 33.2 GM/DL
MCV RBC AUTO: 96.9 FL
MONOCYTES # BLD AUTO: 0.51 K/UL
MONOCYTES NFR BLD AUTO: 7.5 %
NEUTROPHILS # BLD AUTO: 4.59 K/UL
NEUTROPHILS NFR BLD AUTO: 67.2 %
NONHDLC SERPL-MCNC: 161 MG/DL
PLATELET # BLD AUTO: 292 K/UL
POTASSIUM SERPL-SCNC: 4.6 MMOL/L
PROT SERPL-MCNC: 7.4 G/DL
RBC # BLD: 4.23 M/UL
RBC # FLD: 13.8 %
SODIUM SERPL-SCNC: 136 MMOL/L
TRIGL SERPL-MCNC: 120 MG/DL
TSH SERPL-ACNC: 2.29 UIU/ML
WBC # FLD AUTO: 6.82 K/UL

## 2024-04-23 ENCOUNTER — APPOINTMENT (OUTPATIENT)
Dept: UROLOGY | Facility: CLINIC | Age: 89
End: 2024-04-23

## 2024-04-25 ENCOUNTER — APPOINTMENT (OUTPATIENT)
Dept: HEART AND VASCULAR | Facility: CLINIC | Age: 89
End: 2024-04-25

## 2024-04-25 VITALS
OXYGEN SATURATION: 96 % | HEART RATE: 75 BPM | RESPIRATION RATE: 14 BRPM | DIASTOLIC BLOOD PRESSURE: 70 MMHG | SYSTOLIC BLOOD PRESSURE: 134 MMHG

## 2024-04-29 ENCOUNTER — APPOINTMENT (OUTPATIENT)
Dept: HEART AND VASCULAR | Facility: CLINIC | Age: 89
End: 2024-04-29
Payer: MEDICARE

## 2024-04-29 VITALS
BODY MASS INDEX: 28.52 KG/M2 | TEMPERATURE: 97 F | OXYGEN SATURATION: 96 % | DIASTOLIC BLOOD PRESSURE: 66 MMHG | RESPIRATION RATE: 16 BRPM | HEART RATE: 68 BPM | HEIGHT: 62 IN | SYSTOLIC BLOOD PRESSURE: 123 MMHG | WEIGHT: 155 LBS

## 2024-04-29 PROCEDURE — G2211 COMPLEX E/M VISIT ADD ON: CPT

## 2024-04-29 PROCEDURE — 99214 OFFICE O/P EST MOD 30 MIN: CPT

## 2024-04-29 NOTE — PHYSICAL EXAM
[Well Developed] : well developed [Well Nourished] : well nourished [No Acute Distress] : no acute distress [Normal Venous Pressure] : normal venous pressure [No Carotid Bruit] : no carotid bruit [Normal S1, S2] : normal S1, S2 [No Murmur] : no murmur [No Rub] : no rub [No Gallop] : no gallop [Clear Lung Fields] : clear lung fields [Good Air Entry] : good air entry [No Respiratory Distress] : no respiratory distress  [No Edema] : no edema [No Cyanosis] : no cyanosis [No Clubbing] : no clubbing [Moves all extremities] : moves all extremities [No Focal Deficits] : no focal deficits [Normal Speech] : normal speech [Alert and Oriented] : alert and oriented [Normal memory] : normal memory [de-identified] : on wheelchair, needs assistance

## 2024-04-29 NOTE — ASSESSMENT
[FreeTextEntry1] : TARA COLEMAN is an 89 year F with past medical history significant for HTN, hypothyroidism, CVA (without residual neuro deficit), breast cancer s/p right partial mastectomy (2006) s/p RT (2006), PSVT/NSVT (on CCB), anxiety disorder. She is here for follow-up. Holter revealed good PSVT/NSVT suppression, however, recorded 2 pauses (longest 2.9s). She reports on and off palpitations and dizzy spells. She has progressive weakness and decrease functional capacity. She has chronic limited exercise capacity, she needs assistance. She is on wheelchair. Echo was technically difficult. No significant valve disease. Endocardial wall not well visualized. BP is better controlled. She declines having ischemia work-up.   - I reviewed labs from 1/30/24 - I reviewed CT head - I reviewed holter report - Continue diltiazem to 360 mg daily for better arrhythmia suppression.  - continue Valsartan 80 mg daily - refer to EP for tachy-kelly evaluation and possible need for PPM.   - we discussed the option of taking statins --> she declines - continue taking other cardiac meds  - We discussed the option of having a NST. For now she does not wish to have any further cardiac testing.  - she will monitor her BP at home and bring a BP log to next visit   Patient advised to go to the nearest ED whenever any symptoms persist and/or worsens.  Patient/Family/Caregiver verbalized complete understanding of these instructions.  I spent a total of 25 minutes with more than 50% spent on counseling and coordinating care.   RTO in 2-3 mo.

## 2024-05-02 ENCOUNTER — APPOINTMENT (OUTPATIENT)
Dept: INTERNAL MEDICINE | Facility: CLINIC | Age: 89
End: 2024-05-02

## 2024-05-08 ENCOUNTER — APPOINTMENT (OUTPATIENT)
Dept: UROLOGY | Facility: CLINIC | Age: 89
End: 2024-05-08
Payer: MEDICARE

## 2024-05-08 VITALS
BODY MASS INDEX: 28.16 KG/M2 | OXYGEN SATURATION: 97 % | SYSTOLIC BLOOD PRESSURE: 145 MMHG | DIASTOLIC BLOOD PRESSURE: 60 MMHG | RESPIRATION RATE: 16 BRPM | HEART RATE: 70 BPM | TEMPERATURE: 97.5 F | WEIGHT: 153 LBS | HEIGHT: 62 IN

## 2024-05-08 DIAGNOSIS — N32.81 OVERACTIVE BLADDER: ICD-10-CM

## 2024-05-08 PROCEDURE — G2211 COMPLEX E/M VISIT ADD ON: CPT

## 2024-05-08 PROCEDURE — 99204 OFFICE O/P NEW MOD 45 MIN: CPT

## 2024-05-08 RX ORDER — ESTRADIOL 0.1 MG/G
0.1 CREAM VAGINAL
Qty: 1 | Refills: 5 | Status: ACTIVE | COMMUNITY
Start: 2024-05-08 | End: 1900-01-01

## 2024-05-08 RX ORDER — MIRABEGRON 50 MG/1
50 TABLET, EXTENDED RELEASE ORAL DAILY
Qty: 30 | Refills: 3 | Status: ACTIVE | COMMUNITY
Start: 2024-05-08 | End: 1900-01-01

## 2024-05-08 NOTE — HISTORY OF PRESENT ILLNESS
[FreeTextEntry1] :   TARA COLEMAN Feb 21 1935   Language: English  Date of First visit: 05/08/2024 Accompanied by: self  Contact info:  Referring Provider/PCP: Dr. Fonseca Fax: tw   CC/ Problem List:  urinary frequency =============================================================================== FIRST VISIT / Summary: Very pleasant 89 year old F here for OAB-wet. For past ~ 1 year has been experiencing urinary frequency, urgency with 3-5 pads daily (soaked), and nocturia 3x. Has not tried any urinary medications. 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), anxiety disorder Meds: matzim 360 daily, levothyroxine 112 mcg, valsartan 80 mg, vitamin D3 1000 IU   All: influenza vaccines, PCN FHx: father throat cancer, sister uterine cancer no  malignancies  SocHx:  non smoker, no Etoh, no children    PSH: right partial breast surgery 2006    ROS: Review of Systems is as per HPI unless otherwise denoted below   =============================================================================== DATA: LABS (SELECTED):---------------------------------------------------------------------------------------------------  4/11/24: Cr 1.0   RADS:-------------------------------------------------------------------------------------------------------------------     PATHOLOGY/CYTOLOGY:-------------------------------------------------------------------------------------------     VOIDING STUDIES: ----------------------------------------------------------------------------------------------------     STONE STUDIES: (Analysis/LLSA)----------------------------------------------------------------------------------     PROCEDURES: -----------------------------------------------------------------------------------------------       =============================================================================== PHYSICAL EXAM:    FOCUSED: ----------------------------------------------------------------------------------------------------------------     ======================================================================================= DISCUSSION: discussed estrace in light of prior breast cancer and very low risk especially given distant disease.  ======================================================================================= ASSESSMENT and PLAN   1. OAB - discussed options, she will consider estradiol - start myrbetriq 40mg tabs from [facility] - UA and cx   =======================================================================================  4g Thank you for allowing me to assist in the care of your patient. Should you have any questions please do not hesitate to reach out to me.     Niels Del Cid MD                                                        [facility] Physician [facility]   [place] Office: [address], [address] [place] [address] T: [phone] F: [phone]   [place] Office: [address], [address] [place] [address] T: [phone] F: [phone]

## 2024-05-09 LAB
APPEARANCE: CLEAR
BACTERIA: NEGATIVE /HPF
BILIRUBIN URINE: NEGATIVE
BLOOD URINE: NEGATIVE
CAST: 0 /LPF
COLOR: YELLOW
EPITHELIAL CELLS: 1 /HPF
GLUCOSE QUALITATIVE U: NEGATIVE MG/DL
KETONES URINE: NEGATIVE MG/DL
LEUKOCYTE ESTERASE URINE: NEGATIVE
MICROSCOPIC-UA: NORMAL
NITRITE URINE: NEGATIVE
PH URINE: 6
PROTEIN URINE: NEGATIVE MG/DL
RED BLOOD CELLS URINE: 2 /HPF
SPECIFIC GRAVITY URINE: 1.01
UROBILINOGEN URINE: 0.2 MG/DL
WHITE BLOOD CELLS URINE: 0 /HPF

## 2024-05-16 ENCOUNTER — NON-APPOINTMENT (OUTPATIENT)
Age: 89
End: 2024-05-16

## 2024-05-16 DIAGNOSIS — N39.0 URINARY TRACT INFECTION, SITE NOT SPECIFIED: ICD-10-CM

## 2024-05-16 DIAGNOSIS — A49.9 URINARY TRACT INFECTION, SITE NOT SPECIFIED: ICD-10-CM

## 2024-05-16 LAB — BACTERIA UR CULT: ABNORMAL

## 2024-05-16 NOTE — HISTORY OF PRESENT ILLNESS
[FreeTextEntry1] : spoke to patient reviewed UCx result discussed can treat with antibiotics, reviewed SE and sent script to Rx on file patient verbalized understanding and agreement with plan

## 2024-05-30 DIAGNOSIS — R39.9 UNSPECIFIED SYMPTOMS AND SIGNS INVOLVING THE GENITOURINARY SYSTEM: ICD-10-CM

## 2024-06-04 LAB — BACTERIA UR CULT: ABNORMAL

## 2024-06-07 ENCOUNTER — NON-APPOINTMENT (OUTPATIENT)
Age: 89
End: 2024-06-07

## 2024-06-12 ENCOUNTER — NON-APPOINTMENT (OUTPATIENT)
Age: 89
End: 2024-06-12

## 2024-06-12 ENCOUNTER — APPOINTMENT (OUTPATIENT)
Dept: HEART AND VASCULAR | Facility: CLINIC | Age: 89
End: 2024-06-12
Payer: MEDICARE

## 2024-06-12 VITALS
WEIGHT: 150 LBS | BODY MASS INDEX: 27.6 KG/M2 | HEART RATE: 68 BPM | TEMPERATURE: 97.1 F | OXYGEN SATURATION: 98 % | DIASTOLIC BLOOD PRESSURE: 69 MMHG | HEIGHT: 62 IN | RESPIRATION RATE: 16 BRPM | SYSTOLIC BLOOD PRESSURE: 159 MMHG

## 2024-06-12 DIAGNOSIS — I44.30 UNSPECIFIED ATRIOVENTRICULAR BLOCK: ICD-10-CM

## 2024-06-12 DIAGNOSIS — R55 SYNCOPE AND COLLAPSE: ICD-10-CM

## 2024-06-12 PROCEDURE — 93000 ELECTROCARDIOGRAM COMPLETE: CPT

## 2024-06-12 PROCEDURE — 99204 OFFICE O/P NEW MOD 45 MIN: CPT | Mod: 25

## 2024-06-12 RX ORDER — FOSFOMYCIN TROMETHAMINE 3 G/1
3 POWDER ORAL
Qty: 1 | Refills: 0 | Status: COMPLETED | COMMUNITY
Start: 2024-05-16 | End: 2024-06-12

## 2024-06-12 RX ORDER — NITROFURANTOIN (MONOHYDRATE/MACROCRYSTALS) 25; 75 MG/1; MG/1
100 CAPSULE ORAL
Qty: 10 | Refills: 0 | Status: COMPLETED | COMMUNITY
Start: 2024-06-04 | End: 2024-06-12

## 2024-06-14 ENCOUNTER — INPATIENT (INPATIENT)
Facility: HOSPITAL | Age: 88
LOS: 0 days | Discharge: HOME HEALTH | End: 2024-06-15
Attending: INTERNAL MEDICINE | Admitting: INTERNAL MEDICINE
Payer: COMMERCIAL

## 2024-06-14 VITALS — WEIGHT: 158.73 LBS | HEIGHT: 63 IN

## 2024-06-14 LAB
ISTAT INR: 1.2 — HIGH (ref 0.88–1.16)
ISTAT PT: 14.3 SEC — HIGH (ref 10–12.9)
ISTAT VENOUS BE: 2 MMOL/L — SIGNIFICANT CHANGE UP (ref -2–3)
ISTAT VENOUS GLUCOSE: 125 MG/DL — HIGH (ref 70–99)
ISTAT VENOUS HCO3: 27 MMOL/L — SIGNIFICANT CHANGE UP (ref 23–28)
ISTAT VENOUS HEMATOCRIT: 41 % — SIGNIFICANT CHANGE UP (ref 34.5–45)
ISTAT VENOUS HEMOGLOBIN: 13.9 GM/DL — SIGNIFICANT CHANGE UP (ref 11.5–15.5)
ISTAT VENOUS IONIZED CALCIUM: 1.16 MMOL/L — SIGNIFICANT CHANGE UP (ref 1.12–1.3)
ISTAT VENOUS PCO2: 40 MMHG — LOW (ref 41–51)
ISTAT VENOUS PH: 7.43 — HIGH (ref 7.31–7.41)
ISTAT VENOUS PO2: <66 MMHG — LOW (ref 35–40)
ISTAT VENOUS POTASSIUM: 4.4 MMOL/L — SIGNIFICANT CHANGE UP (ref 3.5–5.3)
ISTAT VENOUS SO2: 56 % — SIGNIFICANT CHANGE UP
ISTAT VENOUS SODIUM: 137 MMOL/L — SIGNIFICANT CHANGE UP (ref 135–145)
ISTAT VENOUS TCO2: 28 MMOL/L — SIGNIFICANT CHANGE UP (ref 22–31)
POCT ISTAT CREATININE: 1 MG/DL — SIGNIFICANT CHANGE UP (ref 0.5–1.3)

## 2024-06-14 PROCEDURE — 33274 TCAT INSJ/RPL PERM LDLS PM: CPT | Mod: Q0

## 2024-06-14 RX ORDER — DILTIAZEM HYDROCHLORIDE 240 MG/1
360 TABLET, EXTENDED RELEASE ORAL DAILY
Refills: 0 | Status: DISCONTINUED | OUTPATIENT
Start: 2024-06-15 | End: 2024-06-15

## 2024-06-14 RX ORDER — LEVOTHYROXINE SODIUM 300 MCG
1 TABLET ORAL
Refills: 0 | DISCHARGE

## 2024-06-14 RX ORDER — DILTIAZEM HYDROCHLORIDE 240 MG/1
1 TABLET, EXTENDED RELEASE ORAL
Refills: 0 | DISCHARGE

## 2024-06-14 RX ORDER — AMLODIPINE BESYLATE 10 MG/1
5 TABLET ORAL ONCE
Refills: 0 | Status: COMPLETED | OUTPATIENT
Start: 2024-06-14 | End: 2024-06-14

## 2024-06-14 RX ORDER — ACETAMINOPHEN 500 MG/5ML
650 LIQUID (ML) ORAL EVERY 6 HOURS
Refills: 0 | Status: DISCONTINUED | OUTPATIENT
Start: 2024-06-14 | End: 2024-06-15

## 2024-06-14 RX ORDER — LEVOTHYROXINE SODIUM 300 MCG
112 TABLET ORAL DAILY
Refills: 0 | Status: DISCONTINUED | OUTPATIENT
Start: 2024-06-15 | End: 2024-06-15

## 2024-06-14 RX ADMIN — Medication 650 MILLIGRAM(S): at 17:42

## 2024-06-14 RX ADMIN — Medication 650 MILLIGRAM(S): at 18:42

## 2024-06-14 RX ADMIN — AMLODIPINE BESYLATE 5 MILLIGRAM(S): 10 TABLET ORAL at 19:10

## 2024-06-15 ENCOUNTER — TRANSCRIPTION ENCOUNTER (OUTPATIENT)
Age: 89
End: 2024-06-15

## 2024-06-15 VITALS
RESPIRATION RATE: 18 BRPM | HEART RATE: 84 BPM | SYSTOLIC BLOOD PRESSURE: 174 MMHG | OXYGEN SATURATION: 97 % | DIASTOLIC BLOOD PRESSURE: 74 MMHG

## 2024-06-15 PROCEDURE — 99232 SBSQ HOSP IP/OBS MODERATE 35: CPT

## 2024-06-15 PROCEDURE — 85014 HEMATOCRIT: CPT

## 2024-06-15 PROCEDURE — 93280 PM DEVICE PROGR EVAL DUAL: CPT | Mod: 26

## 2024-06-15 PROCEDURE — 84295 ASSAY OF SERUM SODIUM: CPT

## 2024-06-15 PROCEDURE — 85610 PROTHROMBIN TIME: CPT

## 2024-06-15 PROCEDURE — 71046 X-RAY EXAM CHEST 2 VIEWS: CPT

## 2024-06-15 PROCEDURE — 82803 BLOOD GASES ANY COMBINATION: CPT

## 2024-06-15 PROCEDURE — 82565 ASSAY OF CREATININE: CPT

## 2024-06-15 PROCEDURE — 84132 ASSAY OF SERUM POTASSIUM: CPT

## 2024-06-15 PROCEDURE — C1894: CPT

## 2024-06-15 PROCEDURE — 71046 X-RAY EXAM CHEST 2 VIEWS: CPT | Mod: 26

## 2024-06-15 PROCEDURE — 82947 ASSAY GLUCOSE BLOOD QUANT: CPT

## 2024-06-15 PROCEDURE — 82330 ASSAY OF CALCIUM: CPT

## 2024-06-15 PROCEDURE — C1786: CPT

## 2024-06-15 PROCEDURE — C1769: CPT

## 2024-06-15 PROCEDURE — 99239 HOSP IP/OBS DSCHRG MGMT >30: CPT | Mod: 24

## 2024-06-15 RX ORDER — DILTIAZEM HYDROCHLORIDE 240 MG/1
360 TABLET, EXTENDED RELEASE ORAL DAILY
Refills: 0 | Status: DISCONTINUED | OUTPATIENT
Start: 2024-06-15 | End: 2024-06-15

## 2024-06-15 RX ORDER — ACETAMINOPHEN 500 MG/5ML
650 LIQUID (ML) ORAL EVERY 6 HOURS
Refills: 0 | Status: DISCONTINUED | OUTPATIENT
Start: 2024-06-15 | End: 2024-06-15

## 2024-06-15 RX ADMIN — DILTIAZEM HYDROCHLORIDE 360 MILLIGRAM(S): 240 TABLET, EXTENDED RELEASE ORAL at 06:13

## 2024-06-15 RX ADMIN — Medication 650 MILLIGRAM(S): at 13:35

## 2024-06-15 RX ADMIN — Medication 80 MILLIGRAM(S): at 06:14

## 2024-06-15 RX ADMIN — Medication 650 MILLIGRAM(S): at 01:55

## 2024-06-15 RX ADMIN — Medication 650 MILLIGRAM(S): at 01:25

## 2024-06-15 RX ADMIN — Medication 112 MICROGRAM(S): at 06:14

## 2024-06-15 RX ADMIN — DILTIAZEM HYDROCHLORIDE 360 MILLIGRAM(S): 240 TABLET, EXTENDED RELEASE ORAL at 13:35

## 2024-06-17 PROBLEM — E78.5 HYPERLIPIDEMIA, UNSPECIFIED: Chronic | Status: ACTIVE | Noted: 2024-06-14

## 2024-06-17 PROBLEM — I10 ESSENTIAL (PRIMARY) HYPERTENSION: Chronic | Status: ACTIVE | Noted: 2024-06-14

## 2024-06-17 PROBLEM — I63.9 CEREBRAL INFARCTION, UNSPECIFIED: Chronic | Status: ACTIVE | Noted: 2024-06-14

## 2024-06-17 PROBLEM — E03.9 HYPOTHYROIDISM, UNSPECIFIED: Chronic | Status: ACTIVE | Noted: 2024-06-14

## 2024-06-17 NOTE — REASON FOR VISIT
[Arrhythmia/ECG Abnorrmalities] : arrhythmia/ECG abnormalities [FreeTextEntry1] : Ms. Lawson is an 89 year y.o. 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 No history of AF.   She wore a monitor showing 2.9s pause c/w CHB and a 2.7s pause c/w sinus pause, avg HR 64 bpm, (45-) PAC 5.4% PVC 0.2%, no AF    She is here with her home attendant. Main complaint is a constant sensation of feeling her heart beat. She denies syncope and lightheadedness.  Patient denies changes in her exercise tolerance during the past 6 months. She has limited functional capacity. She walks in her apartment with a walker and goes out mostly on a wheelchair. She reports having poor balance since 2019.  Of note, In Oct 2023, she had an ED visit for "syncope". She reports that she was in shower with her home attendant assisting her as usual. When she was ready to exit the shower she was unable to the lift her right leg to get out of the tub. She tried a couple of times and her home attendant helped her get out and sit on the toilet. She subsequently became unresponsive and weak. She leaned her against the sink. Home attendant thought patient had fainted. The patient states she was able to hear the home attendant speaking to her but she was unable to respond. The patient denies palpitations, CP, SOB, dizziness. The home attendant states episode lasted about 10 min. She called EMS and by the time they arrived she was back to her usual state of health. Her BP was normal. She was taken to the ED ([facility]). CT head without acute findings. Trops were ok. She was not admitted. She was discharged with outpatient follow-up. TIA?  She denies history of MI, DM, smoking. Denies family history of premature ASCVD and /or SCD  No hospitalizations in the past 3 years.

## 2024-06-17 NOTE — END OF VISIT
[Time Spent: ___ minutes] : I have spent [unfilled] minutes of time on the encounter. [FreeTextEntry3] : I, Megan Howard, am scribing for (in the presence of) Dr. Castorena the following sections: HPI, PMH,Family/social history, ROS, Physical Exam, Assessment / Plan.   I, Osmin Castorena, personally performed the services described in the documentation, reviewed the documentation recorded by the scribe in my presence and it accurately and completely records my words and actions.

## 2024-06-18 DIAGNOSIS — I44.2 ATRIOVENTRICULAR BLOCK, COMPLETE: ICD-10-CM

## 2024-06-18 DIAGNOSIS — Z92.3 PERSONAL HISTORY OF IRRADIATION: ICD-10-CM

## 2024-06-18 DIAGNOSIS — Z79.890 HORMONE REPLACEMENT THERAPY: ICD-10-CM

## 2024-06-18 DIAGNOSIS — Z00.6 ENCOUNTER FOR EXAMINATION FOR NORMAL COMPARISON AND CONTROL IN CLINICAL RESEARCH PROGRAM: ICD-10-CM

## 2024-06-18 DIAGNOSIS — E78.5 HYPERLIPIDEMIA, UNSPECIFIED: ICD-10-CM

## 2024-06-18 DIAGNOSIS — I10 ESSENTIAL (PRIMARY) HYPERTENSION: ICD-10-CM

## 2024-06-18 DIAGNOSIS — Z85.3 PERSONAL HISTORY OF MALIGNANT NEOPLASM OF BREAST: ICD-10-CM

## 2024-06-18 DIAGNOSIS — Z86.73 PERSONAL HISTORY OF TRANSIENT ISCHEMIC ATTACK (TIA), AND CEREBRAL INFARCTION WITHOUT RESIDUAL DEFICITS: ICD-10-CM

## 2024-06-18 DIAGNOSIS — Z88.0 ALLERGY STATUS TO PENICILLIN: ICD-10-CM

## 2024-06-18 DIAGNOSIS — E03.9 HYPOTHYROIDISM, UNSPECIFIED: ICD-10-CM

## 2024-06-20 ENCOUNTER — APPOINTMENT (OUTPATIENT)
Age: 89
End: 2024-06-20
Payer: MEDICARE

## 2024-06-20 ENCOUNTER — NON-APPOINTMENT (OUTPATIENT)
Age: 89
End: 2024-06-20

## 2024-06-20 VITALS
RESPIRATION RATE: 15 BRPM | DIASTOLIC BLOOD PRESSURE: 69 MMHG | HEART RATE: 72 BPM | WEIGHT: 150 LBS | HEIGHT: 62 IN | OXYGEN SATURATION: 97 % | BODY MASS INDEX: 27.6 KG/M2 | SYSTOLIC BLOOD PRESSURE: 129 MMHG

## 2024-06-20 DIAGNOSIS — R00.2 PALPITATIONS: ICD-10-CM

## 2024-06-20 DIAGNOSIS — E78.5 HYPERLIPIDEMIA, UNSPECIFIED: ICD-10-CM

## 2024-06-20 DIAGNOSIS — R94.31 ABNORMAL ELECTROCARDIOGRAM [ECG] [EKG]: ICD-10-CM

## 2024-06-20 DIAGNOSIS — I49.5 SICK SINUS SYNDROME: ICD-10-CM

## 2024-06-20 DIAGNOSIS — I10 ESSENTIAL (PRIMARY) HYPERTENSION: ICD-10-CM

## 2024-06-20 DIAGNOSIS — F41.9 ANXIETY DISORDER, UNSPECIFIED: ICD-10-CM

## 2024-06-20 PROCEDURE — 99214 OFFICE O/P EST MOD 30 MIN: CPT | Mod: 25

## 2024-06-20 PROCEDURE — 93010 ELECTROCARDIOGRAM REPORT: CPT

## 2024-06-20 NOTE — ASSESSMENT
[FreeTextEntry1] : TARA COLEMAN is an 89 year F with past medical history significant for HTN, hypothyroidism, CVA (without residual neuro deficit), breast cancer s/p right partial mastectomy (2006) s/p RT (2006), PSVT/NSVT (on CCB), tachy-kelly, anxiety disorder, she has progressive weakness and decrease functional capacity, mostly on wheelchair now. She is here for follow-up post successful leadless VDD pacemaker placement (6/14/24).  She has chronic limited exercise capacity, she needs assistance. She is overall doing well. No new cardiac complaints at this time. BP is controlled.    - I reviewed CT head - I reviewed holter report - Continue diltiazem to 360 mg daily for better arrhythmia suppression.  - continue Valsartan 80 mg daily - we discussed the option of taking statins --> she declines - continue taking other cardiac meds   - she will monitor her BP at home and bring a BP log to next visit   Patient advised to go to the nearest ED whenever any symptoms persist and/or worsens.  Patient/Family/Caregiver verbalized complete understanding of these instructions.  I spent a total of 25 minutes with more than 50% spent on counseling and coordinating care.   RTO in 2 mo.

## 2024-06-20 NOTE — PHYSICAL EXAM
[Well Developed] : well developed [Well Nourished] : well nourished [No Acute Distress] : no acute distress [Normal Venous Pressure] : normal venous pressure [No Carotid Bruit] : no carotid bruit [Normal S1, S2] : normal S1, S2 [No Murmur] : no murmur [No Rub] : no rub [No Gallop] : no gallop [Clear Lung Fields] : clear lung fields [Good Air Entry] : good air entry [No Respiratory Distress] : no respiratory distress  [No Edema] : no edema [No Cyanosis] : no cyanosis [No Clubbing] : no clubbing [Moves all extremities] : moves all extremities [No Focal Deficits] : no focal deficits [Normal Speech] : normal speech [Alert and Oriented] : alert and oriented [Normal memory] : normal memory [de-identified] : on wheelchair, needs assistance

## 2024-06-20 NOTE — REVIEW OF SYSTEMS
[Feeling Fatigued] : feeling fatigued [Dyspnea on exertion] : dyspnea during exertion [Palpitations] : palpitations [Limb Weakness (Paresis)] : limb weakness (Paresis) [Anxiety] : anxiety [Fever] : no fever [Headache] : no headache [Chills] : no chills [SOB] : no shortness of breath [Chest Discomfort] : no chest discomfort [Lower Ext Edema] : no extremity edema [Leg Claudication] : no intermittent leg claudication [Orthopnea] : no orthopnea [PND] : no PND [Syncope] : no syncope [Cough] : no cough [Wheezing] : no wheezing [Coughing Up Blood] : no hemoptysis [Dizziness] : no dizziness [Tremor] : no tremor was seen [Convulsions] : no convulsions [Tingling (Paresthesia)] : no tingling [Confusion] : no confusion was observed [Depression] : no depression [Under Stress] : not under stress [Suicidal] : not suicidal [Easy Bleeding] : no tendency for easy bleeding

## 2024-06-20 NOTE — HISTORY OF PRESENT ILLNESS
[FreeTextEntry1] : TARA COLEMAN is a 89 year y.o. F with medical history significant for HTN, hypothyroidism, CVA (without residual neuro deficit), anxiety disorder, breast cancer s/p right partial mastectomy (2006) s/p RT (2006).  She is here with her home attendant.  She wore a monitor that recorded pauses 2.9s c/w CHB. She is s/p successful implantation of a leadless pacemaker (6/14/24).  She reports previously having palpitations and uncontrolled HTN.  BP today is normal. PPM did not record arrhythmias. She is overall doing better now.   No new cardiac complaints at this time.    Prior Holter recorded PSVT and NSVT runs.  She has declines having ischemia evaluation.  Echo was technically difficult. unable to visualize well the endocardial wall. No significant valve disease. Grade IDD.  She previously did not tolerate toprol 12.5 mg.    No recurrent syncope.  She states she has been anxious.   Her functional capacity has deteriorated. She is on wheelchair. Her legs are weak and needs assistance.    Denies CP, SOB, palpitations, orthopnea, edema Patient denies changes in her exercise tolerance during the past 6 months. She has limited functional capacity. She walks in her apartment with a walker and goes out mostly on a wheelchair.  She reports having poor balance since 2019.  She reports on and off headaches. She has not seen neuro  Sleeps with 3 pillows due to GERD.    Of note,  In Oct 2023, she had an ED visit for "syncope".  She reports that she was in shower with her home attendant assisting her as usual. When she was ready to exit the shower she was unable to the lift her right leg to get out of the tub. She tried a couple of times and her home attendant helped her get out and sit on the toilet. She subsequently became unresponsive and weak. She leaned her against the sink. Home attendant thought patient had fainted. The patient states she was able to hear the home attendant speaking to her but she was unable to respond. The patient denies palpitations, CP, SOB, dizziness. The home attendant states episode lasted about 10 min. She called EMS and by the time they arrived she was back to her usual state of health. Her BP was normal. She was taken to the ED ([facility]). CT head without acute findings. Trops were ok. She was not admitted. She was discharged with outpatient follow-up. TIA?     DATA LABS (1/30/24) TSH 2.93, K 4.7, CRE 1.10, LFT's wnl, eGFR 48, , , HDL 54, , NON-, A1C 5.4, HCT 40.1, HGB 13.6 (12/27/23): Trop Neg; Hgb 14.6; Hct 44.1; Cre 0.9; K 4.1; LFTs wnl; eGFR 61;  (11/22/23) HGB 14.4, HCT 42.3, K 4.7, CRE 0.91, AST 18, ALT 8, , eGFR 61, , , HDL 56, , NON-, TSH 2.85. (10/18/23): Cre 0.7; K 4.1; eGFR 73; LFTs wnl; Hgb 13.9; Hct 40.2; Plt 279;  (10/10/23): INR 1.1; HS trop 5 x2 (nl); eGFR 54; ALT 59; AST 41; Cre 1.0; K 3.9; Hgb 14.2; Hct 41.8;   ECG (6/12/24): NSR at 67 bpm w nl axis , 1st degree AVB (Alecia= 250 ms), LVH, septal Qs, low voltage and nonspecific TW abn  ECG (12/27/23): NSR at 69 bpm w 1st degree AVB (GSm=683 ms), LVH PRWP and no STT abn  (10/30/23): NSR at 89 bpm w nl axis, first degree AVB (Alecia= 226 ms), LVH, PVCs, PRWP and nonspecific ST abn.   ECHO (11/9/23): Technically difficult study. The LV endocardium is not well visualized; cannot r/o RWMA. Grade IDD w normal filling pressures. Mildly dilated LA. AV sclerosis w/o stenosis. No significant valve disease   CXR (12/27/23): The cardiac silhouette is wnl. No definite focal consolidation. Elevation of the right hemidiaphragm.   Holter x 14d (4/25/24): Min HR 45 bpm, Avg HR 64 bpm, Max 120 bpm. PACs (5.4%), rare PVCs (<1%). PSVT x 1 (6 beats long). No VT. Pause x 2 (longest 2.9s).  Holter x6d (11/30/23): min 49 bpm, Avg 69 bpm, Max 132 bpm. NSR with brief PSVT x 13 (longest 7 beats) and brief NSVT x 3 (longest 8 beats). rare PVCs <1%. No pauses.   CT head (10/10/23): No CT evidence of acute ICH or cerebral edema. Chronic infarct within the inferior right frontal lobe and microvascular ischemic changes are noted.

## 2024-06-24 ENCOUNTER — APPOINTMENT (OUTPATIENT)
Dept: HEART AND VASCULAR | Facility: CLINIC | Age: 89
End: 2024-06-24

## 2024-07-03 ENCOUNTER — NON-APPOINTMENT (OUTPATIENT)
Age: 89
End: 2024-07-03

## 2024-07-03 ENCOUNTER — APPOINTMENT (OUTPATIENT)
Dept: HEART AND VASCULAR | Facility: CLINIC | Age: 89
End: 2024-07-03
Payer: MEDICARE

## 2024-07-03 VITALS
BODY MASS INDEX: 27.6 KG/M2 | SYSTOLIC BLOOD PRESSURE: 184 MMHG | HEIGHT: 62 IN | HEART RATE: 77 BPM | DIASTOLIC BLOOD PRESSURE: 74 MMHG | WEIGHT: 150 LBS

## 2024-07-03 PROCEDURE — 93279 PRGRMG DEV EVAL PM/LDLS PM: CPT

## 2024-07-08 ENCOUNTER — RX RENEWAL (OUTPATIENT)
Age: 89
End: 2024-07-08

## 2024-07-08 RX ORDER — DILTIAZEM HYDROCHLORIDE 360 MG/1
360 TABLET, EXTENDED RELEASE ORAL
Qty: 90 | Refills: 1 | Status: ACTIVE | COMMUNITY
Start: 2024-07-08 | End: 1900-01-01

## 2024-07-11 ENCOUNTER — APPOINTMENT (OUTPATIENT)
Age: 89
End: 2024-07-11
Payer: MEDICARE

## 2024-07-11 VITALS
HEART RATE: 72 BPM | OXYGEN SATURATION: 96 % | SYSTOLIC BLOOD PRESSURE: 103 MMHG | TEMPERATURE: 98.1 F | DIASTOLIC BLOOD PRESSURE: 64 MMHG | RESPIRATION RATE: 14 BRPM

## 2024-07-11 DIAGNOSIS — F41.9 ANXIETY DISORDER, UNSPECIFIED: ICD-10-CM

## 2024-07-11 DIAGNOSIS — E03.9 HYPOTHYROIDISM, UNSPECIFIED: ICD-10-CM

## 2024-07-11 DIAGNOSIS — E78.5 HYPERLIPIDEMIA, UNSPECIFIED: ICD-10-CM

## 2024-07-11 DIAGNOSIS — I10 ESSENTIAL (PRIMARY) HYPERTENSION: ICD-10-CM

## 2024-07-11 PROCEDURE — 99204 OFFICE O/P NEW MOD 45 MIN: CPT

## 2024-07-11 PROCEDURE — 99214 OFFICE O/P EST MOD 30 MIN: CPT

## 2024-07-11 PROCEDURE — 36415 COLL VENOUS BLD VENIPUNCTURE: CPT

## 2024-07-11 PROCEDURE — G2211 COMPLEX E/M VISIT ADD ON: CPT

## 2024-07-12 LAB
ALBUMIN SERPL ELPH-MCNC: 4.2 G/DL
ALP BLD-CCNC: 112 U/L
ALT SERPL-CCNC: 9 U/L
ANION GAP SERPL CALC-SCNC: 14 MMOL/L
AST SERPL-CCNC: 18 U/L
BASOPHILS # BLD AUTO: 0.06 K/UL
BASOPHILS NFR BLD AUTO: 0.8 %
BILIRUB SERPL-MCNC: 0.2 MG/DL
BUN SERPL-MCNC: 20 MG/DL
CALCIUM SERPL-MCNC: 9.6 MG/DL
CHLORIDE SERPL-SCNC: 97 MMOL/L
CHOLEST SERPL-MCNC: 221 MG/DL
CO2 SERPL-SCNC: 23 MMOL/L
CREAT SERPL-MCNC: 0.97 MG/DL
EGFR: 56 ML/MIN/1.73M2
EOSINOPHIL # BLD AUTO: 0.32 K/UL
EOSINOPHIL NFR BLD AUTO: 4.4 %
ESTIMATED AVERAGE GLUCOSE: 108 MG/DL
GLUCOSE SERPL-MCNC: 101 MG/DL
HBA1C MFR BLD HPLC: 5.4 %
HCT VFR BLD CALC: 38.6 %
HDLC SERPL-MCNC: 55 MG/DL
IMM GRANULOCYTES NFR BLD AUTO: 0.3 %
LDLC SERPL CALC-MCNC: 142 MG/DL
LYMPHOCYTES NFR BLD AUTO: 19 %
MAN DIFF?: NORMAL
MCHC RBC-ENTMCNC: 32.9 PG
MCHC RBC-ENTMCNC: 34.5 GM/DL
MCV RBC AUTO: 95.5 FL
MONOCYTES # BLD AUTO: 0.63 K/UL
MONOCYTES NFR BLD AUTO: 8.7 %
NEUTROPHILS # BLD AUTO: 4.82 K/UL
NONHDLC SERPL-MCNC: 167 MG/DL
PLATELET # BLD AUTO: 262 K/UL
POTASSIUM SERPL-SCNC: 4.3 MMOL/L
PROT SERPL-MCNC: 7.2 G/DL
RBC # BLD: 4.04 M/UL
RBC # FLD: 12.8 %
SODIUM SERPL-SCNC: 134 MMOL/L
TRIGL SERPL-MCNC: 135 MG/DL
TSH SERPL-ACNC: 3.91 UIU/ML
WBC # FLD AUTO: 7.22 K/UL

## 2024-07-16 ENCOUNTER — APPOINTMENT (OUTPATIENT)
Age: 89
End: 2024-07-16

## 2024-07-16 VITALS
SYSTOLIC BLOOD PRESSURE: 128 MMHG | TEMPERATURE: 98.1 F | WEIGHT: 150 LBS | BODY MASS INDEX: 27.6 KG/M2 | DIASTOLIC BLOOD PRESSURE: 67 MMHG | HEIGHT: 62 IN | OXYGEN SATURATION: 95 % | RESPIRATION RATE: 14 BRPM | HEART RATE: 72 BPM

## 2024-07-16 DIAGNOSIS — A49.9 URINARY TRACT INFECTION, SITE NOT SPECIFIED: ICD-10-CM

## 2024-07-16 DIAGNOSIS — N39.0 URINARY TRACT INFECTION, SITE NOT SPECIFIED: ICD-10-CM

## 2024-07-16 PROCEDURE — G2211 COMPLEX E/M VISIT ADD ON: CPT

## 2024-07-16 PROCEDURE — 99213 OFFICE O/P EST LOW 20 MIN: CPT

## 2024-07-19 LAB — BACTERIA UR CULT: NORMAL

## 2024-08-07 ENCOUNTER — APPOINTMENT (OUTPATIENT)
Age: 89
End: 2024-08-07

## 2024-08-07 PROBLEM — J20.9 ACUTE BRONCHITIS, UNSPECIFIED: Status: ACTIVE | Noted: 2024-08-07 | Resolved: 2024-09-06

## 2024-08-07 PROCEDURE — 99213 OFFICE O/P EST LOW 20 MIN: CPT

## 2024-08-07 NOTE — PHYSICAL EXAM
[Normal] : normal rate, regular rhythm, normal S1 and S2 and no murmur heard [de-identified] : b/l crepitations

## 2024-08-07 NOTE — PHYSICAL EXAM
Chief Complaint:  Recurrent SAB x 2    History of Present Illness:    Nahomi is a 27 year old  Patient's last menstrual period was 2019. who presents today for evaluation of recurrent miscarriage.   She and her  started trying to conceive, conceived quickly both times.  Her first SAB was at 7 weeks gestation and 2nd was at 6 weeks gestation.  No ultrasound done either pregnancy and no D&C required.    Pt states she had surgery age 12 or 13 in OR to correct ?hymenal issue   Past Medical History:   Diagnosis Date   • Anxiety    • Chronic insomnia    • Chronic tension headaches     improved per d/w pt 18   • Depression    • Esophageal reflux     had EGD done   • History of echocardiogram     EF 67%   • IBS (irritable bowel syndrome)     dx with GI-Dr. Galivn   • Kidney stone 2012    multiple episodes-follows with urology and has also seen nephrology   • PONV (postoperative nausea and vomiting)    • SAB (spontaneous )      Hx anxiety, on zoloft and hx kidney stones lithotripsy.  She is on no meds and denies tobacco use.  No family hx of cystic fibrosis.    She is sexually active.  Patient is currently using nothing for birth control.     Past Medical History:   Diagnosis Date   • Anxiety    • Chronic insomnia    • Chronic tension headaches     improved per d/w pt 18   • Depression    • Esophageal reflux     had EGD done   • History of echocardiogram     EF 67%   • IBS (irritable bowel syndrome)     dx with GI-Dr. Galvin   • Kidney stone 2012    multiple episodes-follows with urology and has also seen nephrology   • PONV (postoperative nausea and vomiting)    • SAB (spontaneous )           Past Surgical History:   Procedure Laterality Date   • Colonoscopy  2011    another one around    • Extracorporeal shock wave lithotripsy  12   • Vagina surgery      Had labia fusion corrected   • Alan  [Normal] : normal rate, regular rhythm, normal S1 and S2 and no murmur heard tooth extraction  2010     Current Outpatient Medications   Medication Sig Dispense Refill   • sertraline (ZOLOFT) 50 MG tablet Take 1.5 tablets by mouth daily. 135 tablet 1     No current facility-administered medications for this visit.       ALLERGIES:  No Known Allergies  Social History     Socioeconomic History   • Marital status: Single     Spouse name: Not on file   • Number of children: 0   • Years of education: Not on file   • Highest education level: Not on file   Occupational History   • Occupation: [employer]-     Employer: NOT EMPLOYED     Comment: started in 2016   Social Needs   • Financial resource strain: Not on file   • Food insecurity:     Worry: Not on file     Inability: Not on file   • Transportation needs:     Medical: Not on file     Non-medical: Not on file   Tobacco Use   • Smoking status: Never Smoker   • Smokeless tobacco: Never Used   Substance and Sexual Activity   • Alcohol use: Yes     Alcohol/week: 4.0 standard drinks     Types: 4 Standard drinks or equivalent per week     Frequency: 2-3 times a week     Drinks per session: 1 or 2     Binge frequency: Monthly     Comment: 4 drinks/week   • Drug use: No   • Sexual activity: Yes     Partners: Male     Birth control/protection: Condom   Lifestyle   • Physical activity:     Days per week: Not on file     Minutes per session: Not on file   • Stress: Not on file   Relationships   • Social connections:     Talks on phone: Not on file     Gets together: Not on file     Attends Yarsanism service: Not on file     Active member of club or organization: Not on file     Attends meetings of clubs or organizations: Not on file     Relationship status: Not on file   • Intimate partner violence:     Fear of current or ex partner: Not on file     Emotionally abused: Not on file     Physically abused: Not on file     Forced sexual activity: Not on file   Other Topics Concern   •  Service Not Asked   • Blood Transfusions  Not Asked   • Caffeine Concern Not Asked   • Occupational Exposure Not Asked   • Hobby Hazards Not Asked   • Sleep Concern Not Asked   • Stress Concern Not Asked   • Weight Concern Not Asked   • Special Diet Not Asked   • Back Care Not Asked   • Exercise No   • Bike Helmet Not Asked   • Seat Belt Yes   • Self-Exams Not Asked   Social History Narrative    Lives with GRETCHEN Quarles (he is a  for [employer])    One dog.                     Family History   Problem Relation Age of Onset   • Patient is unaware of any medical problems Mother    • Ulcerative Colitis Father    • Dermatitis Sister    • ADHD/ADD Brother    • Psychiatric Brother         Autism   • Diabetes Maternal Grandmother    • Patient is unaware of any medical problems Maternal Grandfather    • Dementia/Alzheimers Paternal Grandmother    • Heart disease Paternal Grandfather        Review of Systems:    CONSTITUTIONAL:  Denies fever.   CV:  Denies chest pain, palpitations.    RESPIRATORY:  Denies SOB, chronic cough.    GI:  Denies abdominal pain, blood in stool.    :  Denies frequency, dysuria.    NEURO:  Denies headaches.    PSYCH:  Denies depression, anxiety.    Physical Exam:  Visit Vitals  /80   Wt 83.5 kg   LMP 08/29/2019   BMI 31.58 kg/m²     GENERAL APPEARANCE:  The patient is a pleasant, normal appearing female with normal affect and in no distress.  NECK:  No masses, no thyroid masses or cervical lymphadenopathy.  HEART:  RRR, no tachycardia or murmurs.  LUNGS:  CTA bilaterally posteriorly.  BACK:  No CVAT.  ABDOMEN:  Soft, non-tender, non-distended.  No hepatosplenomegaly.  No umbilical or inguinal hernias.  SKIN:  No lesions or rashes noted.    PSYCHIATRIC/NEUROLOGIC:  Appropriate mood and affect, normal recall, alert.  EXTREMITIES:  Warm and well perfused.  No edema noted.   HEM/LYMPH:  No bruising, no adenopathy, no edema, no palpable neck, axillary or groin nodes.  :  GYNECOLOGIC:  External genitalia show no lesions.  URETHRAL  [de-identified] : b/l crepitations MEATUS:  No polyps.  LABIAL MINORA/MAJORA:  Normal, no lesions.  SKENE'S GLANDS:  No erythema present.  VAGINA:  Normal mucosa.  CERVIX:  Normal and without  lesions or masses   UTERUS:  Normal size, mobile , non-tender, anteverted  ADNEXA:  No masses  and no tenderness  RECTAL:  No external hemorrhoids.    LABORATORY DATA:    All pertinent laboratory results were reviewed and pertinent positives are TSH, APA labs ordered. Will hold off on chromosomal testing for now.    IMAGING STUDIES:    Imaging studies reviewed.  The pertinent positives are pelvic ultrasound ordered.    ASSESSMENT:   Diagnoses and all orders for this visit:  History of recurrent miscarriages, not currently pregnant. Pt advised to start MVI with FA. Await 2 regular cycles before conceiving. Plan early progesterone testing.   -     THYROID STIMULATING HORMONE REFLEX; Future  -     LUPUS ANTICOAGULANT; Future  -     CARDIOLIPIN ANTIBODY PANEL; Future  -     BETA 2 GLYCOPROTEIN PANEL; Future  -     US PELVIS COMPLETE NON OB; Future

## 2024-08-09 ENCOUNTER — RX RENEWAL (OUTPATIENT)
Age: 89
End: 2024-08-09

## 2024-08-22 ENCOUNTER — APPOINTMENT (OUTPATIENT)
Age: 89
End: 2024-08-22

## 2024-08-22 VITALS
HEART RATE: 62 BPM | OXYGEN SATURATION: 95 % | BODY MASS INDEX: 27.6 KG/M2 | WEIGHT: 150 LBS | RESPIRATION RATE: 16 BRPM | DIASTOLIC BLOOD PRESSURE: 65 MMHG | SYSTOLIC BLOOD PRESSURE: 123 MMHG | TEMPERATURE: 97 F | HEIGHT: 62 IN

## 2024-08-22 DIAGNOSIS — E78.5 HYPERLIPIDEMIA, UNSPECIFIED: ICD-10-CM

## 2024-08-22 DIAGNOSIS — Z95.0 PRESENCE OF CARDIAC PACEMAKER: ICD-10-CM

## 2024-08-22 DIAGNOSIS — I49.5 SICK SINUS SYNDROME: ICD-10-CM

## 2024-08-22 DIAGNOSIS — I10 ESSENTIAL (PRIMARY) HYPERTENSION: ICD-10-CM

## 2024-08-22 PROCEDURE — G2211 COMPLEX E/M VISIT ADD ON: CPT

## 2024-08-22 PROCEDURE — 99214 OFFICE O/P EST MOD 30 MIN: CPT

## 2024-08-22 NOTE — PHYSICAL EXAM
[Well Developed] : well developed [Well Nourished] : well nourished [No Acute Distress] : no acute distress [Normal Venous Pressure] : normal venous pressure [No Carotid Bruit] : no carotid bruit [Normal S1, S2] : normal S1, S2 [No Murmur] : no murmur [No Rub] : no rub [No Gallop] : no gallop [Clear Lung Fields] : clear lung fields [Good Air Entry] : good air entry [No Respiratory Distress] : no respiratory distress  [No Edema] : no edema [No Cyanosis] : no cyanosis [No Clubbing] : no clubbing [Moves all extremities] : moves all extremities [No Focal Deficits] : no focal deficits [Normal Speech] : normal speech [Alert and Oriented] : alert and oriented [Normal memory] : normal memory [de-identified] : on wheelchair, needs assistance

## 2024-08-22 NOTE — ASSESSMENT
[FreeTextEntry1] : TARA COLEMAN is an 89 year F with past medical history significant for HTN, hypothyroidism, CVA (without residual neuro deficit), breast cancer s/p right partial mastectomy (2006) s/p RT (2006), PSVT/NSVT (on CCB), tachy-kelly, sinus pauses s/p leadless VDD pacemaker placement (6/14/24), anxiety disorder, she has progressive weakness and decrease functional capacity, mostly on wheelchair now. She is here for follow-up.  No new cardiac complaints at this time. BP is controlled.    - I reviewed labs from 7/11/24. - I reviewed CT head - I reviewed holter report - Continue diltiazem to 360 mg daily for better arrhythmia suppression.  - continue Valsartan 80 mg daily - we discussed the option of taking statins --> she declines - continue taking other cardiac meds   - she will monitor her BP at home and bring a BP log to next visit   Patient advised to go to the nearest ED whenever any symptoms persist and/or worsens.  Patient/Family/Caregiver verbalized complete understanding of these instructions.  I spent a total of 25 minutes with more than 50% spent on counseling and coordinating care.   RTO in 3-4 mo.

## 2024-08-22 NOTE — HISTORY OF PRESENT ILLNESS
[FreeTextEntry1] : TARA COLEMAN is a 89 year y.o. F with medical history significant for HTN, hypothyroidism, CVA (without residual neuro deficit), anxiety disorder, breast cancer s/p right partial mastectomy (2006) s/p RT (2006).  She is here with her home attendant.  She wore a monitor that recorded pauses 2.9s c/w CHB. She is s/p successful implantation of a leadless pacemaker (6/14/24).  She reports previously having palpitations and uncontrolled HTN.  BP today is normal. PPM did not record arrhythmias. She is overall doing better now.   No new cardiac complaints at this time.    Prior Holter recorded PSVT and NSVT runs.  She has declines having ischemia evaluation. She is on CCB. Previously did not tolerate toprol.  Echo was technically difficult. unable to visualize well the endocardial wall. No significant valve disease. Grade IDD.  No recurrent syncope.  She states she has been anxious.   Her functional capacity has deteriorated. She is on wheelchair. Her legs are weak and needs assistance.    Denies CP, SOB, palpitations, orthopnea, edema Patient denies changes in her exercise tolerance during the past 6 months. She has limited functional capacity. She walks in her apartment with a walker and goes out mostly on a wheelchair.  She reports having poor balance since 2019.  She reports on and off headaches. She has not seen neuro  Sleeps with 3 pillows due to GERD.    Of note,  In Oct 2023, she had an ED visit for "syncope".  She reports that she was in shower with her home attendant assisting her as usual. When she was ready to exit the shower she was unable to the lift her right leg to get out of the tub. She tried a couple of times and her home attendant helped her get out and sit on the toilet. She subsequently became unresponsive and weak. She leaned her against the sink. Home attendant thought patient had fainted. The patient states she was able to hear the home attendant speaking to her but she was unable to respond. The patient denies palpitations, CP, SOB, dizziness. The home attendant states episode lasted about 10 min. She called EMS and by the time they arrived she was back to her usual state of health. Her BP was normal. She was taken to the ED ([facility]). CT head without acute findings. Trops were ok. She was not admitted. She was discharged with outpatient follow-up. TIA?     DATA LABS (7/11/24): A1C 5.4, TSH 3.91, K 4.3, CRE 0.97, AST 18, ALT 9, , eGFR 56, , , HDL 55, , NON-, HCT 38.6, HGB 13.3. (1/30/24) TSH 2.93, K 4.7, CRE 1.10, LFT's wnl, eGFR 48, , , HDL 54, , NON-, A1C 5.4, HCT 40.1, HGB 13.6 (12/27/23): Trop Neg; Hgb 14.6; Hct 44.1; Cre 0.9; K 4.1; LFTs wnl; eGFR 61;  (11/22/23) HGB 14.4, HCT 42.3, K 4.7, CRE 0.91, AST 18, ALT 8, , eGFR 61, , , HDL 56, , NON-, TSH 2.85. (10/18/23): Cre 0.7; K 4.1; eGFR 73; LFTs wnl; Hgb 13.9; Hct 40.2; Plt 279;  (10/10/23): INR 1.1; HS trop 5 x2 (nl); eGFR 54; ALT 59; AST 41; Cre 1.0; K 3.9; Hgb 14.2; Hct 41.8;   ECG (6/12/24): NSR at 67 bpm w nl axis, 1st degree AVB (Alecia= 250 ms), LVH, septal Qs, low voltage and nonspecific TW abn  ECG (12/27/23): NSR at 69 bpm w 1st degree AVB (OCp=214 ms), LVH PRWP and no STT abn  (10/30/23): NSR at 89 bpm w nl axis, first degree AVB (Alecia= 226 ms), LVH, PVCs, PRWP and nonspecific ST abn.   ECHO (11/9/23): Technically difficult study. The LV endocardium is not well visualized; cannot r/o RWMA. Grade IDD w normal filling pressures. Mildly dilated LA. AV sclerosis w/o stenosis. No significant valve disease   CXR (12/27/23): The cardiac silhouette is wnl. No definite focal consolidation. Elevation of the right hemidiaphragm.   Holter x 14d (4/25/24): Min HR 45 bpm, Avg HR 64 bpm, Max 120 bpm. PACs (5.4%), rare PVCs (<1%). PSVT x 1 (6 beats long). No VT. Pause x 2 (longest 2.9s).  Holter x6d (11/30/23): min 49 bpm, Avg 69 bpm, Max 132 bpm. NSR with brief PSVT x 13 (longest 7 beats) and brief NSVT x 3 (longest 8 beats). rare PVCs <1%. No pauses.   CT head (10/10/23): No CT evidence of acute ICH or cerebral edema. Chronic infarct within the inferior right frontal lobe and microvascular ischemic changes are noted.

## 2024-10-02 ENCOUNTER — NON-APPOINTMENT (OUTPATIENT)
Age: 89
End: 2024-10-02

## 2024-10-02 ENCOUNTER — APPOINTMENT (OUTPATIENT)
Dept: HEART AND VASCULAR | Facility: CLINIC | Age: 89
End: 2024-10-02
Payer: MEDICARE

## 2024-10-02 PROCEDURE — 93294 REM INTERROG EVL PM/LDLS PM: CPT

## 2024-10-02 PROCEDURE — 93296 REM INTERROG EVL PM/IDS: CPT

## 2024-10-07 ENCOUNTER — RX RENEWAL (OUTPATIENT)
Age: 89
End: 2024-10-07

## 2024-10-15 ENCOUNTER — APPOINTMENT (OUTPATIENT)
Age: 89
End: 2024-10-15
Payer: MEDICARE

## 2024-10-15 VITALS
OXYGEN SATURATION: 96 % | TEMPERATURE: 98 F | HEART RATE: 73 BPM | RESPIRATION RATE: 14 BRPM | DIASTOLIC BLOOD PRESSURE: 61 MMHG | SYSTOLIC BLOOD PRESSURE: 145 MMHG

## 2024-10-15 DIAGNOSIS — Z00.00 ENCOUNTER FOR GENERAL ADULT MEDICAL EXAMINATION W/OUT ABNORMAL FINDINGS: ICD-10-CM

## 2024-10-15 DIAGNOSIS — E03.9 HYPOTHYROIDISM, UNSPECIFIED: ICD-10-CM

## 2024-10-15 DIAGNOSIS — I10 ESSENTIAL (PRIMARY) HYPERTENSION: ICD-10-CM

## 2024-10-15 DIAGNOSIS — E78.5 HYPERLIPIDEMIA, UNSPECIFIED: ICD-10-CM

## 2024-10-15 PROCEDURE — G0439: CPT

## 2024-10-15 PROCEDURE — G0444 DEPRESSION SCREEN ANNUAL: CPT | Mod: 59

## 2024-10-15 PROCEDURE — 36415 COLL VENOUS BLD VENIPUNCTURE: CPT

## 2024-10-16 LAB
ALBUMIN SERPL ELPH-MCNC: 4.1 G/DL
ALP BLD-CCNC: 100 U/L
ALT SERPL-CCNC: 7 U/L
ANION GAP SERPL CALC-SCNC: 15 MMOL/L
AST SERPL-CCNC: 15 U/L
BASOPHILS # BLD AUTO: 0.02 K/UL
BASOPHILS NFR BLD AUTO: 0.3 %
BILIRUB SERPL-MCNC: 0.4 MG/DL
BUN SERPL-MCNC: 18 MG/DL
CALCIUM SERPL-MCNC: 9.5 MG/DL
CHLORIDE SERPL-SCNC: 97 MMOL/L
CHOLEST SERPL-MCNC: 234 MG/DL
CO2 SERPL-SCNC: 23 MMOL/L
CREAT SERPL-MCNC: 0.97 MG/DL
EGFR: 56 ML/MIN/1.73M2
EOSINOPHIL # BLD AUTO: 0.17 K/UL
EOSINOPHIL NFR BLD AUTO: 3 %
ESTIMATED AVERAGE GLUCOSE: 114 MG/DL
GLUCOSE SERPL-MCNC: 107 MG/DL
HBA1C MFR BLD HPLC: 5.6 %
HCT VFR BLD CALC: 40.4 %
HDLC SERPL-MCNC: 55 MG/DL
HGB BLD-MCNC: 13.5 G/DL
IMM GRANULOCYTES NFR BLD AUTO: 0.2 %
LDLC SERPL CALC-MCNC: 156 MG/DL
LYMPHOCYTES # BLD AUTO: 1.18 K/UL
LYMPHOCYTES NFR BLD AUTO: 20.5 %
MAN DIFF?: NORMAL
MCHC RBC-ENTMCNC: 31.8 PG
MCHC RBC-ENTMCNC: 33.4 GM/DL
MCV RBC AUTO: 95.3 FL
MONOCYTES # BLD AUTO: 0.51 K/UL
MONOCYTES NFR BLD AUTO: 8.9 %
NEUTROPHILS # BLD AUTO: 3.87 K/UL
NEUTROPHILS NFR BLD AUTO: 67.1 %
NONHDLC SERPL-MCNC: 179 MG/DL
PLATELET # BLD AUTO: 281 K/UL
POTASSIUM SERPL-SCNC: 4.1 MMOL/L
PROT SERPL-MCNC: 7.2 G/DL
RBC # BLD: 4.24 M/UL
RBC # FLD: 14.7 %
SODIUM SERPL-SCNC: 135 MMOL/L
TRIGL SERPL-MCNC: 132 MG/DL
TSH SERPL-ACNC: 3.4 UIU/ML
WBC # FLD AUTO: 5.76 K/UL

## 2024-10-18 LAB
APPEARANCE: CLEAR
BILIRUBIN URINE: NEGATIVE
BLOOD URINE: NEGATIVE
COLOR: YELLOW
CREAT SPEC-SCNC: 51 MG/DL
GLUCOSE QUALITATIVE U: NEGATIVE MG/DL
KETONES URINE: NEGATIVE MG/DL
LEUKOCYTE ESTERASE URINE: NEGATIVE
MICROALBUMIN 24H UR DL<=1MG/L-MCNC: <1.2 MG/DL
MICROALBUMIN/CREAT 24H UR-RTO: NORMAL MG/G
NITRITE URINE: NEGATIVE
PH URINE: 5.5
PROTEIN URINE: NEGATIVE MG/DL
SPECIFIC GRAVITY URINE: 1.01
UROBILINOGEN URINE: 0.2 MG/DL

## 2024-11-05 ENCOUNTER — APPOINTMENT (OUTPATIENT)
Age: 89
End: 2024-11-05
Payer: MEDICARE

## 2024-11-05 VITALS
DIASTOLIC BLOOD PRESSURE: 65 MMHG | BODY MASS INDEX: 27.6 KG/M2 | OXYGEN SATURATION: 98 % | WEIGHT: 150 LBS | RESPIRATION RATE: 14 BRPM | HEIGHT: 62 IN | SYSTOLIC BLOOD PRESSURE: 154 MMHG | HEART RATE: 68 BPM | TEMPERATURE: 98 F

## 2024-11-05 PROCEDURE — 99214 OFFICE O/P EST MOD 30 MIN: CPT

## 2024-11-05 PROCEDURE — G2211 COMPLEX E/M VISIT ADD ON: CPT

## 2024-11-21 ENCOUNTER — APPOINTMENT (OUTPATIENT)
Age: 89
End: 2024-11-21
Payer: MEDICARE

## 2024-11-21 VITALS
OXYGEN SATURATION: 98 % | HEIGHT: 62 IN | TEMPERATURE: 97 F | DIASTOLIC BLOOD PRESSURE: 66 MMHG | WEIGHT: 150 LBS | HEART RATE: 68 BPM | SYSTOLIC BLOOD PRESSURE: 149 MMHG | BODY MASS INDEX: 27.6 KG/M2 | RESPIRATION RATE: 16 BRPM

## 2024-11-21 DIAGNOSIS — I49.5 SICK SINUS SYNDROME: ICD-10-CM

## 2024-11-21 DIAGNOSIS — E78.5 HYPERLIPIDEMIA, UNSPECIFIED: ICD-10-CM

## 2024-11-21 DIAGNOSIS — Z95.0 PRESENCE OF CARDIAC PACEMAKER: ICD-10-CM

## 2024-11-21 DIAGNOSIS — I10 ESSENTIAL (PRIMARY) HYPERTENSION: ICD-10-CM

## 2024-11-21 PROCEDURE — 99214 OFFICE O/P EST MOD 30 MIN: CPT

## 2024-11-21 PROCEDURE — G2211 COMPLEX E/M VISIT ADD ON: CPT

## 2025-01-02 ENCOUNTER — APPOINTMENT (OUTPATIENT)
Dept: HEART AND VASCULAR | Facility: CLINIC | Age: 89
End: 2025-01-02

## 2025-01-02 PROCEDURE — 93294 REM INTERROG EVL PM/LDLS PM: CPT

## 2025-01-02 PROCEDURE — 93296 REM INTERROG EVL PM/IDS: CPT

## 2025-01-14 ENCOUNTER — LABORATORY RESULT (OUTPATIENT)
Age: 89
End: 2025-01-14

## 2025-01-14 ENCOUNTER — APPOINTMENT (OUTPATIENT)
Age: 89
End: 2025-01-14
Payer: MEDICARE

## 2025-01-14 VITALS
SYSTOLIC BLOOD PRESSURE: 146 MMHG | DIASTOLIC BLOOD PRESSURE: 70 MMHG | RESPIRATION RATE: 14 BRPM | BODY MASS INDEX: 28.71 KG/M2 | HEIGHT: 62 IN | HEART RATE: 60 BPM | OXYGEN SATURATION: 92 % | WEIGHT: 156 LBS | TEMPERATURE: 98.2 F

## 2025-01-14 DIAGNOSIS — I10 ESSENTIAL (PRIMARY) HYPERTENSION: ICD-10-CM

## 2025-01-14 DIAGNOSIS — E03.9 HYPOTHYROIDISM, UNSPECIFIED: ICD-10-CM

## 2025-01-14 DIAGNOSIS — E78.5 HYPERLIPIDEMIA, UNSPECIFIED: ICD-10-CM

## 2025-01-14 PROCEDURE — 99213 OFFICE O/P EST LOW 20 MIN: CPT

## 2025-01-14 PROCEDURE — 36415 COLL VENOUS BLD VENIPUNCTURE: CPT

## 2025-01-14 RX ORDER — EZETIMIBE 10 MG/1
10 TABLET ORAL DAILY
Qty: 90 | Refills: 3 | Status: ACTIVE | COMMUNITY
Start: 2025-01-14 | End: 1900-01-01

## 2025-01-15 LAB
ALBUMIN SERPL ELPH-MCNC: 4.1 G/DL
ALP BLD-CCNC: 115 U/L
ALT SERPL-CCNC: 9 U/L
ANION GAP SERPL CALC-SCNC: 14 MMOL/L
AST SERPL-CCNC: 18 U/L
BILIRUB SERPL-MCNC: 0.3 MG/DL
BUN SERPL-MCNC: 25 MG/DL
CALCIUM SERPL-MCNC: 9.4 MG/DL
CHLORIDE SERPL-SCNC: 98 MMOL/L
CO2 SERPL-SCNC: 23 MMOL/L
CREAT SERPL-MCNC: 1.09 MG/DL
EGFR: 49 ML/MIN/1.73M2
GLUCOSE SERPL-MCNC: 108 MG/DL
POTASSIUM SERPL-SCNC: 4.5 MMOL/L
PROT SERPL-MCNC: 6.9 G/DL
SODIUM SERPL-SCNC: 134 MMOL/L
TSH SERPL-ACNC: 6.58 UIU/ML

## 2025-02-27 ENCOUNTER — APPOINTMENT (OUTPATIENT)
Age: 89
End: 2025-02-27
Payer: MEDICARE

## 2025-02-27 ENCOUNTER — NON-APPOINTMENT (OUTPATIENT)
Age: 89
End: 2025-02-27

## 2025-02-27 VITALS
DIASTOLIC BLOOD PRESSURE: 71 MMHG | OXYGEN SATURATION: 98 % | HEART RATE: 59 BPM | SYSTOLIC BLOOD PRESSURE: 132 MMHG | HEIGHT: 62 IN | RESPIRATION RATE: 16 BRPM | TEMPERATURE: 97 F

## 2025-02-27 DIAGNOSIS — I49.3 VENTRICULAR PREMATURE DEPOLARIZATION: ICD-10-CM

## 2025-02-27 DIAGNOSIS — I10 ESSENTIAL (PRIMARY) HYPERTENSION: ICD-10-CM

## 2025-02-27 DIAGNOSIS — R94.31 ABNORMAL ELECTROCARDIOGRAM [ECG] [EKG]: ICD-10-CM

## 2025-02-27 DIAGNOSIS — E78.5 HYPERLIPIDEMIA, UNSPECIFIED: ICD-10-CM

## 2025-02-27 DIAGNOSIS — I44.30 UNSPECIFIED ATRIOVENTRICULAR BLOCK: ICD-10-CM

## 2025-02-27 DIAGNOSIS — F41.9 ANXIETY DISORDER, UNSPECIFIED: ICD-10-CM

## 2025-02-27 PROCEDURE — 99214 OFFICE O/P EST MOD 30 MIN: CPT | Mod: 25

## 2025-02-27 PROCEDURE — 93000 ELECTROCARDIOGRAM COMPLETE: CPT | Mod: 59

## 2025-03-20 ENCOUNTER — APPOINTMENT (OUTPATIENT)
Age: 89
End: 2025-03-20
Payer: MEDICARE

## 2025-03-20 VITALS
HEART RATE: 69 BPM | SYSTOLIC BLOOD PRESSURE: 148 MMHG | BODY MASS INDEX: 28.71 KG/M2 | DIASTOLIC BLOOD PRESSURE: 62 MMHG | TEMPERATURE: 98 F | RESPIRATION RATE: 16 BRPM | OXYGEN SATURATION: 97 % | HEIGHT: 62 IN | WEIGHT: 156 LBS

## 2025-03-20 DIAGNOSIS — E78.5 HYPERLIPIDEMIA, UNSPECIFIED: ICD-10-CM

## 2025-03-20 DIAGNOSIS — I49.3 VENTRICULAR PREMATURE DEPOLARIZATION: ICD-10-CM

## 2025-03-20 DIAGNOSIS — I10 ESSENTIAL (PRIMARY) HYPERTENSION: ICD-10-CM

## 2025-03-20 DIAGNOSIS — I49.5 SICK SINUS SYNDROME: ICD-10-CM

## 2025-03-20 DIAGNOSIS — Z95.0 PRESENCE OF CARDIAC PACEMAKER: ICD-10-CM

## 2025-03-20 DIAGNOSIS — I47.29 OTHER VENTRICULAR TACHYCARDIA: ICD-10-CM

## 2025-03-20 PROCEDURE — G2211 COMPLEX E/M VISIT ADD ON: CPT

## 2025-03-20 PROCEDURE — 99214 OFFICE O/P EST MOD 30 MIN: CPT

## 2025-03-20 RX ORDER — METOPROLOL SUCCINATE 25 MG/1
25 TABLET, EXTENDED RELEASE ORAL
Qty: 30 | Refills: 2 | Status: ACTIVE | COMMUNITY
Start: 2025-03-20 | End: 1900-01-01

## 2025-04-03 ENCOUNTER — NON-APPOINTMENT (OUTPATIENT)
Age: 89
End: 2025-04-03

## 2025-04-03 ENCOUNTER — APPOINTMENT (OUTPATIENT)
Dept: HEART AND VASCULAR | Facility: CLINIC | Age: 89
End: 2025-04-03
Payer: MEDICARE

## 2025-04-03 PROCEDURE — 93294 REM INTERROG EVL PM/LDLS PM: CPT

## 2025-04-03 PROCEDURE — 93296 REM INTERROG EVL PM/IDS: CPT

## 2025-04-07 ENCOUNTER — APPOINTMENT (OUTPATIENT)
Age: 89
End: 2025-04-07
Payer: MEDICARE

## 2025-04-07 ENCOUNTER — NON-APPOINTMENT (OUTPATIENT)
Age: 89
End: 2025-04-07

## 2025-04-07 VITALS
TEMPERATURE: 97 F | HEIGHT: 62 IN | SYSTOLIC BLOOD PRESSURE: 136 MMHG | HEART RATE: 57 BPM | RESPIRATION RATE: 16 BRPM | DIASTOLIC BLOOD PRESSURE: 56 MMHG | OXYGEN SATURATION: 98 % | BODY MASS INDEX: 28.71 KG/M2 | WEIGHT: 156 LBS

## 2025-04-07 DIAGNOSIS — E78.5 HYPERLIPIDEMIA, UNSPECIFIED: ICD-10-CM

## 2025-04-07 DIAGNOSIS — I49.5 SICK SINUS SYNDROME: ICD-10-CM

## 2025-04-07 DIAGNOSIS — Z95.0 PRESENCE OF CARDIAC PACEMAKER: ICD-10-CM

## 2025-04-07 DIAGNOSIS — I10 ESSENTIAL (PRIMARY) HYPERTENSION: ICD-10-CM

## 2025-04-07 DIAGNOSIS — I49.3 VENTRICULAR PREMATURE DEPOLARIZATION: ICD-10-CM

## 2025-04-07 PROCEDURE — 99214 OFFICE O/P EST MOD 30 MIN: CPT

## 2025-04-07 PROCEDURE — G2211 COMPLEX E/M VISIT ADD ON: CPT

## 2025-04-22 ENCOUNTER — APPOINTMENT (OUTPATIENT)
Age: 89
End: 2025-04-22
Payer: MEDICARE

## 2025-04-22 ENCOUNTER — NON-APPOINTMENT (OUTPATIENT)
Age: 89
End: 2025-04-22

## 2025-04-22 VITALS
OXYGEN SATURATION: 97 % | HEART RATE: 51 BPM | SYSTOLIC BLOOD PRESSURE: 116 MMHG | HEIGHT: 62 IN | DIASTOLIC BLOOD PRESSURE: 63 MMHG | WEIGHT: 156 LBS | RESPIRATION RATE: 16 BRPM | TEMPERATURE: 97 F | BODY MASS INDEX: 28.71 KG/M2

## 2025-04-22 DIAGNOSIS — I10 ESSENTIAL (PRIMARY) HYPERTENSION: ICD-10-CM

## 2025-04-22 DIAGNOSIS — E78.5 HYPERLIPIDEMIA, UNSPECIFIED: ICD-10-CM

## 2025-04-22 DIAGNOSIS — E03.9 HYPOTHYROIDISM, UNSPECIFIED: ICD-10-CM

## 2025-04-22 DIAGNOSIS — N32.81 OVERACTIVE BLADDER: ICD-10-CM

## 2025-04-22 PROCEDURE — 99213 OFFICE O/P EST LOW 20 MIN: CPT

## 2025-04-22 PROCEDURE — G2211 COMPLEX E/M VISIT ADD ON: CPT

## 2025-04-22 PROCEDURE — 36415 COLL VENOUS BLD VENIPUNCTURE: CPT

## 2025-04-23 LAB
ALBUMIN SERPL ELPH-MCNC: 3.8 G/DL
ALP BLD-CCNC: 91 U/L
ALT SERPL-CCNC: 8 U/L
ANION GAP SERPL CALC-SCNC: 13 MMOL/L
AST SERPL-CCNC: 15 U/L
BASOPHILS # BLD AUTO: 0.04 K/UL
BASOPHILS NFR BLD AUTO: 0.6 %
BILIRUB SERPL-MCNC: 0.5 MG/DL
BUN SERPL-MCNC: 22 MG/DL
CALCIUM SERPL-MCNC: 9.3 MG/DL
CHLORIDE SERPL-SCNC: 99 MMOL/L
CHOLEST SERPL-MCNC: 176 MG/DL
CO2 SERPL-SCNC: 21 MMOL/L
CREAT SERPL-MCNC: 1.03 MG/DL
EGFRCR SERPLBLD CKD-EPI 2021: 52 ML/MIN/1.73M2
EOSINOPHIL # BLD AUTO: 0.27 K/UL
EOSINOPHIL NFR BLD AUTO: 4.4 %
GLUCOSE SERPL-MCNC: 103 MG/DL
HCT VFR BLD CALC: 35 %
HDLC SERPL-MCNC: 54 MG/DL
HGB BLD-MCNC: 12.1 G/DL
IMM GRANULOCYTES NFR BLD AUTO: 0.3 %
LDLC SERPL-MCNC: 106 MG/DL
LYMPHOCYTES # BLD AUTO: 1.44 K/UL
LYMPHOCYTES NFR BLD AUTO: 23.4 %
MAN DIFF?: NORMAL
MCHC RBC-ENTMCNC: 34.6 G/DL
MCHC RBC-ENTMCNC: 34.7 PG
MCV RBC AUTO: 100.3 FL
MONOCYTES # BLD AUTO: 0.64 K/UL
MONOCYTES NFR BLD AUTO: 10.4 %
NEUTROPHILS # BLD AUTO: 3.75 K/UL
NEUTROPHILS NFR BLD AUTO: 60.9 %
NONHDLC SERPL-MCNC: 122 MG/DL
PLATELET # BLD AUTO: 253 K/UL
POTASSIUM SERPL-SCNC: 4.4 MMOL/L
PROT SERPL-MCNC: 6.8 G/DL
RBC # BLD: 3.49 M/UL
RBC # FLD: 14.2 %
SODIUM SERPL-SCNC: 134 MMOL/L
TRIGL SERPL-MCNC: 86 MG/DL
TSH SERPL-ACNC: 2.27 UIU/ML
WBC # FLD AUTO: 6.16 K/UL

## 2025-05-05 ENCOUNTER — APPOINTMENT (OUTPATIENT)
Age: 89
End: 2025-05-05
Payer: MEDICARE

## 2025-05-05 VITALS
RESPIRATION RATE: 16 BRPM | HEART RATE: 62 BPM | WEIGHT: 156 LBS | SYSTOLIC BLOOD PRESSURE: 162 MMHG | BODY MASS INDEX: 28.71 KG/M2 | TEMPERATURE: 97 F | OXYGEN SATURATION: 96 % | HEIGHT: 62 IN | DIASTOLIC BLOOD PRESSURE: 69 MMHG

## 2025-05-05 DIAGNOSIS — R00.2 PALPITATIONS: ICD-10-CM

## 2025-05-05 DIAGNOSIS — R94.31 ABNORMAL ELECTROCARDIOGRAM [ECG] [EKG]: ICD-10-CM

## 2025-05-05 DIAGNOSIS — I10 ESSENTIAL (PRIMARY) HYPERTENSION: ICD-10-CM

## 2025-05-05 DIAGNOSIS — I49.3 VENTRICULAR PREMATURE DEPOLARIZATION: ICD-10-CM

## 2025-05-05 DIAGNOSIS — Z95.0 PRESENCE OF CARDIAC PACEMAKER: ICD-10-CM

## 2025-05-05 PROCEDURE — 99214 OFFICE O/P EST MOD 30 MIN: CPT

## 2025-05-05 PROCEDURE — G2211 COMPLEX E/M VISIT ADD ON: CPT

## 2025-05-22 ENCOUNTER — APPOINTMENT (OUTPATIENT)
Age: 89
End: 2025-05-22

## 2025-06-19 ENCOUNTER — APPOINTMENT (OUTPATIENT)
Age: 89
End: 2025-06-19
Payer: MEDICARE

## 2025-06-19 VITALS
WEIGHT: 156 LBS | DIASTOLIC BLOOD PRESSURE: 66 MMHG | TEMPERATURE: 97 F | HEART RATE: 61 BPM | SYSTOLIC BLOOD PRESSURE: 130 MMHG | OXYGEN SATURATION: 98 % | BODY MASS INDEX: 28.71 KG/M2 | HEIGHT: 62 IN | RESPIRATION RATE: 16 BRPM

## 2025-06-19 PROBLEM — R39.15 URGENCY OF URINATION: Status: ACTIVE | Noted: 2025-06-19
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
## 2025-06-19 PROCEDURE — G2211 COMPLEX E/M VISIT ADD ON: CPT

## 2025-06-19 PROCEDURE — 99214 OFFICE O/P EST MOD 30 MIN: CPT

## 2025-06-19 PROCEDURE — 99213 OFFICE O/P EST LOW 20 MIN: CPT

## 2025-06-19 RX ORDER — DILTIAZEM HYDROCHLORIDE 240 MG/1
240 CAPSULE, EXTENDED RELEASE ORAL
Qty: 90 | Refills: 3 | Status: ACTIVE | COMMUNITY
Start: 1900-01-01 | End: 1900-01-01

## 2025-06-19 RX ORDER — LEVOTHYROXINE SODIUM 0.11 MG/1
112 TABLET ORAL DAILY
Qty: 90 | Refills: 3 | Status: ACTIVE | COMMUNITY
Start: 1900-01-01 | End: 1900-01-01

## 2025-06-23 RX ORDER — BUSPIRONE HYDROCHLORIDE 5 MG/1
5 TABLET ORAL
Qty: 30 | Refills: 1 | Status: ACTIVE | COMMUNITY
Start: 2025-06-23

## 2025-07-15 ENCOUNTER — APPOINTMENT (OUTPATIENT)
Dept: HEART AND VASCULAR | Facility: CLINIC | Age: 89
End: 2025-07-15

## 2025-07-22 ENCOUNTER — LABORATORY RESULT (OUTPATIENT)
Age: 89
End: 2025-07-22

## 2025-07-22 ENCOUNTER — APPOINTMENT (OUTPATIENT)
Age: 89
End: 2025-07-22
Payer: MEDICARE

## 2025-07-22 VITALS
DIASTOLIC BLOOD PRESSURE: 72 MMHG | HEART RATE: 73 BPM | SYSTOLIC BLOOD PRESSURE: 138 MMHG | RESPIRATION RATE: 14 BRPM | TEMPERATURE: 97.8 F | OXYGEN SATURATION: 99 %

## 2025-07-22 DIAGNOSIS — E83.119 HEMOCHROMATOSIS, UNSPECIFIED: ICD-10-CM

## 2025-07-22 DIAGNOSIS — E78.5 HYPERLIPIDEMIA, UNSPECIFIED: ICD-10-CM

## 2025-07-22 PROCEDURE — 36415 COLL VENOUS BLD VENIPUNCTURE: CPT

## 2025-07-22 PROCEDURE — 99213 OFFICE O/P EST LOW 20 MIN: CPT

## 2025-07-22 RX ORDER — FUROSEMIDE 20 MG/1
20 TABLET ORAL DAILY
Qty: 14 | Refills: 0 | Status: ACTIVE | COMMUNITY
Start: 2025-07-22 | End: 1900-01-01

## 2025-07-30 LAB
ALBUMIN SERPL ELPH-MCNC: 3.9 G/DL
ALP BLD-CCNC: 105 U/L
ALT SERPL-CCNC: 7 U/L
ANION GAP SERPL CALC-SCNC: 18 MMOL/L
AST SERPL-CCNC: 22 U/L
BILIRUB SERPL-MCNC: 0.4 MG/DL
BUN SERPL-MCNC: 20 MG/DL
CALCIUM SERPL-MCNC: 9.9 MG/DL
CHLORIDE SERPL-SCNC: 99 MMOL/L
CO2 SERPL-SCNC: 22 MMOL/L
CREAT SERPL-MCNC: 0.91 MG/DL
EGFRCR SERPLBLD CKD-EPI 2021: 60 ML/MIN/1.73M2
GLUCOSE SERPL-MCNC: 102 MG/DL
POTASSIUM SERPL-SCNC: 3.7 MMOL/L
PROT SERPL-MCNC: 6.9 G/DL
SODIUM SERPL-SCNC: 139 MMOL/L
TSH SERPL-ACNC: 5.09 UIU/ML

## 2025-07-31 LAB
BASOPHILS # BLD AUTO: 0.03 K/UL
BASOPHILS NFR BLD AUTO: 0.5 %
EOSINOPHIL # BLD AUTO: 0.2 K/UL
EOSINOPHIL NFR BLD AUTO: 3.2 %
HCT VFR BLD CALC: 38.9 %
HGB BLD-MCNC: 12.9 G/DL
IMM GRANULOCYTES NFR BLD AUTO: 0.5 %
LYMPHOCYTES # BLD AUTO: 1.14 K/UL
LYMPHOCYTES NFR BLD AUTO: 18.4 %
MAN DIFF?: NORMAL
MCHC RBC-ENTMCNC: 33.2 G/DL
MCHC RBC-ENTMCNC: 34.9 PG
MCV RBC AUTO: 105.1 FL
MONOCYTES # BLD AUTO: 0.53 K/UL
MONOCYTES NFR BLD AUTO: 8.6 %
NEUTROPHILS # BLD AUTO: 4.25 K/UL
NEUTROPHILS NFR BLD AUTO: 68.8 %
PLATELET # BLD AUTO: 244 K/UL
RBC # BLD: 3.7 M/UL
RBC # FLD: 14.6 %
WBC # FLD AUTO: 6.18 K/UL

## 2025-08-06 ENCOUNTER — APPOINTMENT (OUTPATIENT)
Age: 89
End: 2025-08-06
Payer: MEDICARE

## 2025-08-06 PROCEDURE — 93970 EXTREMITY STUDY: CPT

## 2025-08-14 ENCOUNTER — APPOINTMENT (OUTPATIENT)
Age: 89
End: 2025-08-14
Payer: MEDICARE

## 2025-08-14 VITALS
OXYGEN SATURATION: 97 % | HEIGHT: 62 IN | SYSTOLIC BLOOD PRESSURE: 144 MMHG | TEMPERATURE: 98.5 F | DIASTOLIC BLOOD PRESSURE: 80 MMHG | HEART RATE: 77 BPM | RESPIRATION RATE: 15 BRPM

## 2025-08-14 DIAGNOSIS — I10 ESSENTIAL (PRIMARY) HYPERTENSION: ICD-10-CM

## 2025-08-14 DIAGNOSIS — E03.9 HYPOTHYROIDISM, UNSPECIFIED: ICD-10-CM

## 2025-08-14 DIAGNOSIS — R60.0 LOCALIZED EDEMA: ICD-10-CM

## 2025-08-14 DIAGNOSIS — M79.605 PAIN IN RIGHT LEG: ICD-10-CM

## 2025-08-14 DIAGNOSIS — M79.604 PAIN IN RIGHT LEG: ICD-10-CM

## 2025-08-14 PROCEDURE — G2211 COMPLEX E/M VISIT ADD ON: CPT

## 2025-08-14 PROCEDURE — 99214 OFFICE O/P EST MOD 30 MIN: CPT

## 2025-08-19 ENCOUNTER — RX RENEWAL (OUTPATIENT)
Age: 89
End: 2025-08-19

## 2025-08-19 ENCOUNTER — NON-APPOINTMENT (OUTPATIENT)
Age: 89
End: 2025-08-19

## 2025-08-19 ENCOUNTER — APPOINTMENT (OUTPATIENT)
Dept: HEART AND VASCULAR | Facility: CLINIC | Age: 89
End: 2025-08-19
Payer: MEDICARE

## 2025-08-19 VITALS — HEART RATE: 45 BPM | DIASTOLIC BLOOD PRESSURE: 60 MMHG | OXYGEN SATURATION: 98 % | SYSTOLIC BLOOD PRESSURE: 133 MMHG

## 2025-08-19 PROCEDURE — 93280 PM DEVICE PROGR EVAL DUAL: CPT

## 2025-08-19 PROCEDURE — 99213 OFFICE O/P EST LOW 20 MIN: CPT | Mod: 25

## 2025-08-21 ENCOUNTER — APPOINTMENT (OUTPATIENT)
Age: 89
End: 2025-08-21

## 2025-08-21 VITALS
SYSTOLIC BLOOD PRESSURE: 138 MMHG | DIASTOLIC BLOOD PRESSURE: 64 MMHG | TEMPERATURE: 97.6 F | RESPIRATION RATE: 14 BRPM | OXYGEN SATURATION: 99 % | HEIGHT: 63 IN | WEIGHT: 150 LBS | BODY MASS INDEX: 26.58 KG/M2 | HEART RATE: 64 BPM

## 2025-08-21 PROCEDURE — 99204 OFFICE O/P NEW MOD 45 MIN: CPT

## 2025-08-21 RX ORDER — METOPROLOL SUCCINATE 50 MG/1
50 TABLET, EXTENDED RELEASE ORAL DAILY
Qty: 90 | Refills: 0 | Status: ACTIVE | COMMUNITY
Start: 2025-08-19

## 2025-08-21 RX ORDER — MELOXICAM 7.5 MG/1
7.5 TABLET ORAL TWICE DAILY
Qty: 20 | Refills: 0 | Status: ACTIVE | COMMUNITY
Start: 2025-08-14

## 2025-08-21 RX ORDER — FUROSEMIDE 40 MG/1
40 TABLET ORAL DAILY
Qty: 30 | Refills: 0 | Status: ACTIVE | COMMUNITY
Start: 2025-08-14 | End: 1900-01-01

## 2025-09-03 ENCOUNTER — APPOINTMENT (OUTPATIENT)
Age: 89
End: 2025-09-03
Payer: MEDICARE

## 2025-09-03 PROCEDURE — 93306 TTE W/DOPPLER COMPLETE: CPT

## 2025-09-03 PROCEDURE — 93923 UPR/LXTR ART STDY 3+ LVLS: CPT

## 2025-09-04 ENCOUNTER — APPOINTMENT (OUTPATIENT)
Dept: HEART AND VASCULAR | Facility: CLINIC | Age: 89
End: 2025-09-04
Payer: MEDICARE

## 2025-09-04 VITALS
SYSTOLIC BLOOD PRESSURE: 134 MMHG | DIASTOLIC BLOOD PRESSURE: 77 MMHG | OXYGEN SATURATION: 96 % | TEMPERATURE: 97.1 F | HEART RATE: 65 BPM | BODY MASS INDEX: 26.58 KG/M2 | WEIGHT: 150 LBS | HEIGHT: 63 IN

## 2025-09-04 DIAGNOSIS — Z80.49 FAMILY HISTORY OF MALIGNANT NEOPLASM OF OTHER GENITAL ORGANS: ICD-10-CM

## 2025-09-04 DIAGNOSIS — Z86.73 PERSONAL HISTORY OF TRANSIENT ISCHEMIC ATTACK (TIA), AND CEREBRAL INFARCTION W/OUT RESIDUAL DEFICITS: ICD-10-CM

## 2025-09-04 DIAGNOSIS — Z74.09 OTHER REDUCED MOBILITY: ICD-10-CM

## 2025-09-04 DIAGNOSIS — E83.119 HEMOCHROMATOSIS, UNSPECIFIED: ICD-10-CM

## 2025-09-04 DIAGNOSIS — I89.0 LYMPHEDEMA, NOT ELSEWHERE CLASSIFIED: ICD-10-CM

## 2025-09-04 DIAGNOSIS — Z82.49 FAMILY HISTORY OF ISCHEMIC HEART DISEASE AND OTHER DISEASES OF THE CIRCULATORY SYSTEM: ICD-10-CM

## 2025-09-04 DIAGNOSIS — E03.9 HYPOTHYROIDISM, UNSPECIFIED: ICD-10-CM

## 2025-09-04 DIAGNOSIS — M79.604 PAIN IN RIGHT LEG: ICD-10-CM
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
## 2025-09-04 DIAGNOSIS — M79.605 PAIN IN RIGHT LEG: ICD-10-CM
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
## 2025-09-04 PROCEDURE — 99205 OFFICE O/P NEW HI 60 MIN: CPT

## 2025-09-04 RX ORDER — MULTIVIT-MIN/IRON/FOLIC ACID/K 18-600-40
CAPSULE ORAL
Refills: 0 | Status: ACTIVE | COMMUNITY

## 2025-09-08 ENCOUNTER — APPOINTMENT (OUTPATIENT)
Age: 89
End: 2025-09-08
Payer: MEDICARE

## 2025-09-08 VITALS
HEART RATE: 68 BPM | SYSTOLIC BLOOD PRESSURE: 152 MMHG | HEIGHT: 63 IN | OXYGEN SATURATION: 98 % | WEIGHT: 150 LBS | DIASTOLIC BLOOD PRESSURE: 74 MMHG | TEMPERATURE: 97 F | BODY MASS INDEX: 26.58 KG/M2 | RESPIRATION RATE: 16 BRPM

## 2025-09-08 DIAGNOSIS — I87.2 VENOUS INSUFFICIENCY (CHRONIC) (PERIPHERAL): ICD-10-CM

## 2025-09-08 DIAGNOSIS — F41.9 ANXIETY DISORDER, UNSPECIFIED: ICD-10-CM

## 2025-09-08 PROCEDURE — G2211 COMPLEX E/M VISIT ADD ON: CPT

## 2025-09-08 PROCEDURE — 99214 OFFICE O/P EST MOD 30 MIN: CPT

## 2025-09-18 ENCOUNTER — APPOINTMENT (OUTPATIENT)
Age: 89
End: 2025-09-18
Payer: MEDICARE

## 2025-09-18 ENCOUNTER — LABORATORY RESULT (OUTPATIENT)
Age: 89
End: 2025-09-18

## 2025-09-18 VITALS
HEIGHT: 63 IN | HEART RATE: 64 BPM | DIASTOLIC BLOOD PRESSURE: 65 MMHG | WEIGHT: 150 LBS | BODY MASS INDEX: 26.58 KG/M2 | RESPIRATION RATE: 16 BRPM | OXYGEN SATURATION: 95 % | TEMPERATURE: 98 F | SYSTOLIC BLOOD PRESSURE: 139 MMHG

## 2025-09-18 DIAGNOSIS — I10 ESSENTIAL (PRIMARY) HYPERTENSION: ICD-10-CM

## 2025-09-18 DIAGNOSIS — E78.5 HYPERLIPIDEMIA, UNSPECIFIED: ICD-10-CM

## 2025-09-18 DIAGNOSIS — Z95.0 PRESENCE OF CARDIAC PACEMAKER: ICD-10-CM

## 2025-09-18 DIAGNOSIS — I49.5 SICK SINUS SYNDROME: ICD-10-CM

## 2025-09-18 DIAGNOSIS — Z00.00 ENCOUNTER FOR GENERAL ADULT MEDICAL EXAMINATION W/OUT ABNORMAL FINDINGS: ICD-10-CM

## 2025-09-18 DIAGNOSIS — R60.0 LOCALIZED EDEMA: ICD-10-CM

## 2025-09-18 DIAGNOSIS — L03.90 CELLULITIS, UNSPECIFIED: ICD-10-CM

## 2025-09-18 PROCEDURE — G0439: CPT

## 2025-09-18 PROCEDURE — 99214 OFFICE O/P EST MOD 30 MIN: CPT

## 2025-09-18 PROCEDURE — G2211 COMPLEX E/M VISIT ADD ON: CPT

## 2025-09-18 PROCEDURE — 36415 COLL VENOUS BLD VENIPUNCTURE: CPT

## 2025-09-18 RX ORDER — DOXYCYCLINE HYCLATE 100 MG/1
100 TABLET ORAL TWICE DAILY
Qty: 14 | Refills: 0 | Status: ACTIVE | COMMUNITY
Start: 2025-09-18 | End: 1900-01-01

## 2025-09-18 RX ORDER — SPIRONOLACTONE 25 MG/1
25 TABLET ORAL DAILY
Qty: 30 | Refills: 3 | Status: ACTIVE | COMMUNITY
Start: 2025-09-18 | End: 1900-01-01

## 2025-09-19 LAB
ALBUMIN SERPL ELPH-MCNC: 3.9 G/DL
ALP BLD-CCNC: 96 U/L
ALT SERPL-CCNC: 9 U/L
ANION GAP SERPL CALC-SCNC: 13 MMOL/L
AST SERPL-CCNC: 21 U/L
BASOPHILS # BLD AUTO: 0.07 K/UL
BASOPHILS NFR BLD AUTO: 1 %
BILIRUB SERPL-MCNC: 0.4 MG/DL
BUN SERPL-MCNC: 27 MG/DL
CALCIUM SERPL-MCNC: 9.9 MG/DL
CHLORIDE SERPL-SCNC: 98 MMOL/L
CHOLEST SERPL-MCNC: 193 MG/DL
CO2 SERPL-SCNC: 27 MMOL/L
CREAT SERPL-MCNC: 1.09 MG/DL
EGFRCR SERPLBLD CKD-EPI 2021: 48 ML/MIN/1.73M2
EOSINOPHIL # BLD AUTO: 0.24 K/UL
EOSINOPHIL NFR BLD AUTO: 3.4 %
ESTIMATED AVERAGE GLUCOSE: 111 MG/DL
GLUCOSE SERPL-MCNC: 101 MG/DL
HBA1C MFR BLD HPLC: 5.5 %
HCT VFR BLD CALC: 36.4 %
HDLC SERPL-MCNC: 50 MG/DL
HGB BLD-MCNC: 12.1 G/DL
IMM GRANULOCYTES NFR BLD AUTO: 0.1 %
LDLC SERPL-MCNC: 121 MG/DL
LYMPHOCYTES # BLD AUTO: 1.28 K/UL
LYMPHOCYTES NFR BLD AUTO: 18.2 %
MAN DIFF?: NORMAL
MCHC RBC-ENTMCNC: 33.2 G/DL
MCHC RBC-ENTMCNC: 33.6 PG
MCV RBC AUTO: 101.1 FL
MONOCYTES # BLD AUTO: 0.67 K/UL
MONOCYTES NFR BLD AUTO: 9.5 %
NEUTROPHILS # BLD AUTO: 4.75 K/UL
NEUTROPHILS NFR BLD AUTO: 67.8 %
NONHDLC SERPL-MCNC: 142 MG/DL
PLATELET # BLD AUTO: 240 K/UL
POTASSIUM SERPL-SCNC: 4.1 MMOL/L
PROT SERPL-MCNC: 7.1 G/DL
RBC # BLD: 3.6 M/UL
RBC # FLD: 13.8 %
SODIUM SERPL-SCNC: 139 MMOL/L
TRIGL SERPL-MCNC: 120 MG/DL
TSH SERPL-ACNC: 5.93 UIU/ML
WBC # FLD AUTO: 7.02 K/UL